# Patient Record
Sex: MALE | Race: WHITE | URBAN - METROPOLITAN AREA
[De-identification: names, ages, dates, MRNs, and addresses within clinical notes are randomized per-mention and may not be internally consistent; named-entity substitution may affect disease eponyms.]

---

## 2017-01-11 ENCOUNTER — TELEPHONE (OUTPATIENT)
Dept: FAMILY MEDICINE CLINIC | Facility: CLINIC | Age: 37
End: 2017-01-11

## 2017-02-09 ENCOUNTER — TELEPHONE (OUTPATIENT)
Dept: FAMILY MEDICINE CLINIC | Facility: CLINIC | Age: 37
End: 2017-02-09

## 2017-02-09 RX ORDER — LISINOPRIL AND HYDROCHLOROTHIAZIDE 12.5; 1 MG/1; MG/1
1 TABLET ORAL DAILY
Qty: 90 TABLET | Refills: 0 | Status: SHIPPED | OUTPATIENT
Start: 2017-02-09 | End: 2017-05-22

## 2017-02-09 NOTE — TELEPHONE ENCOUNTER
Pt is requesting a re-fill for medication Lisinopril. Pt states he only has 2 left. Pt states he is new to pharmacy. Please advise.        Current outpatient prescriptions:   •  Lisinopril-Hydrochlorothiazide 10-12.5 MG Oral Tab, Take 1 tablet by mouth da

## 2017-02-09 NOTE — TELEPHONE ENCOUNTER
Requesting Lisinopril-HCTZ refill    LMTCB, transfer to [facility] ext [phone].  Noted at last OV pt was to RTC 4-6 weeks for follow up, pt needs to schedule appt     Prescription refilled per FM refill protocol    Hypertensive Medications  Protocol Criteria:  ·

## 2017-03-03 ENCOUNTER — TELEPHONE (OUTPATIENT)
Dept: FAMILY MEDICINE CLINIC | Facility: CLINIC | Age: 37
End: 2017-03-03

## 2017-03-03 NOTE — TELEPHONE ENCOUNTER
Pt would like to know if  [facility] still has a copy of the DCFS forms that was filled out last fall? If so pt will need a copy of those forms faxed to Fax:[phone]. This is pt personal fax.

## 2017-03-06 NOTE — TELEPHONE ENCOUNTER
Informed pt that DCFS form is printed and ready for pickup at [facility] . Pt's wife Geno Putt with  the forms and knows that an ID is required. Pt voiced understanding.

## 2017-03-07 PROBLEM — I10 ESSENTIAL HYPERTENSION: Status: ACTIVE | Noted: 2017-03-07

## 2017-03-07 NOTE — TELEPHONE ENCOUNTER
Pt. Calling to find out if RN was able to find the old form from Last fall? Pt. States that he also wants to get a copy of the new completed form faxed to him before 2pm, if not then he can pick it up.

## 2017-05-22 RX ORDER — LISINOPRIL AND HYDROCHLOROTHIAZIDE 12.5; 1 MG/1; MG/1
TABLET ORAL
Qty: 90 TABLET | Refills: 0 | Status: SHIPPED | OUTPATIENT
Start: 2017-05-22 | End: 2017-06-19

## 2017-06-19 ENCOUNTER — OFFICE VISIT (OUTPATIENT)
Dept: FAMILY MEDICINE CLINIC | Facility: CLINIC | Age: 37
End: 2017-06-19

## 2017-06-19 ENCOUNTER — APPOINTMENT (OUTPATIENT)
Dept: LAB | Age: 37
End: 2017-06-19
Attending: FAMILY MEDICINE
Payer: COMMERCIAL

## 2017-06-19 VITALS
RESPIRATION RATE: 18 BRPM | DIASTOLIC BLOOD PRESSURE: 76 MMHG | BODY MASS INDEX: 19 KG/M2 | TEMPERATURE: 98 F | SYSTOLIC BLOOD PRESSURE: 116 MMHG | WEIGHT: 156 LBS | HEART RATE: 82 BPM

## 2017-06-19 DIAGNOSIS — I10 ESSENTIAL HYPERTENSION: Primary | ICD-10-CM

## 2017-06-19 DIAGNOSIS — I10 ESSENTIAL HYPERTENSION: ICD-10-CM

## 2017-06-19 PROCEDURE — 99212 OFFICE O/P EST SF 10 MIN: CPT | Performed by: FAMILY MEDICINE

## 2017-06-19 PROCEDURE — 80048 BASIC METABOLIC PNL TOTAL CA: CPT

## 2017-06-19 PROCEDURE — 99213 OFFICE O/P EST LOW 20 MIN: CPT | Performed by: FAMILY MEDICINE

## 2017-06-19 RX ORDER — LISINOPRIL AND HYDROCHLOROTHIAZIDE 12.5; 1 MG/1; MG/1
1 TABLET ORAL
Qty: 90 TABLET | Refills: 1 | Status: SHIPPED | OUTPATIENT
Start: 2017-06-19 | End: 2018-03-26

## 2017-06-19 NOTE — PROGRESS NOTES
HPI:    Patient ID: Deangelo Jaimes is a 42year old male. Hypertension  This is a chronic problem. The current episode started more than 1 month ago. The problem has been gradually improving since onset. The problem is controlled.  Pertinent negatives i Lisinopril-Hydrochlorothiazide 10-12.5 MG Oral Tab 90 tablet 1      Sig: Take 1 tablet by mouth once daily.            Imaging & Referrals:  None       BONNIE#[id number]

## 2017-06-21 ENCOUNTER — TELEPHONE (OUTPATIENT)
Dept: FAMILY MEDICINE CLINIC | Facility: CLINIC | Age: 37
End: 2017-06-21

## 2017-06-21 NOTE — TELEPHONE ENCOUNTER
----- Message from Mariposa Rivera MD sent at 6/20/2017  9:20 AM CDT -----  Please let patient know his electrolytes and kidney function are normal on medication.   However his potassium is lower normal.  Try to eat a lot of high potassium foods such as avoc

## 2017-06-22 NOTE — TELEPHONE ENCOUNTER
Verified pt name and . Reviewed test results and recommendations with pt per doctor's instructions. Pt agreed with plan of care and will adjust diet accordingly.

## 2017-07-31 ENCOUNTER — TELEPHONE (OUTPATIENT)
Dept: FAMILY MEDICINE CLINIC | Facility: CLINIC | Age: 37
End: 2017-07-31

## 2017-09-19 ENCOUNTER — IMMUNIZATION (OUTPATIENT)
Dept: FAMILY MEDICINE CLINIC | Facility: CLINIC | Age: 37
End: 2017-09-19

## 2017-09-19 DIAGNOSIS — Z23 NEED FOR VACCINATION: ICD-10-CM

## 2017-09-19 PROCEDURE — 90686 IIV4 VACC NO PRSV 0.5 ML IM: CPT | Performed by: FAMILY MEDICINE

## 2017-09-19 PROCEDURE — 90471 IMMUNIZATION ADMIN: CPT | Performed by: FAMILY MEDICINE

## 2018-03-26 RX ORDER — LISINOPRIL AND HYDROCHLOROTHIAZIDE 12.5; 1 MG/1; MG/1
TABLET ORAL
Qty: 90 TABLET | Refills: 0 | Status: SHIPPED | OUTPATIENT
Start: 2018-03-26 | End: 2018-07-07

## 2018-03-26 NOTE — TELEPHONE ENCOUNTER
Please advise on refill request. Refill denied due to no appointment in last 6 months.  LOV 06/19/17    Hypertensive Medications  Protocol Criteria:  · Appointment scheduled in the past 6 months or in the next 3 months  · BMP or CMP in the past 12 months  ·

## 2018-07-08 RX ORDER — LISINOPRIL AND HYDROCHLOROTHIAZIDE 12.5; 1 MG/1; MG/1
TABLET ORAL
Qty: 90 TABLET | Refills: 0 | Status: SHIPPED | OUTPATIENT
Start: 2018-07-08 | End: 2018-10-26

## 2018-10-27 RX ORDER — LISINOPRIL AND HYDROCHLOROTHIAZIDE 12.5; 1 MG/1; MG/1
TABLET ORAL
Qty: 90 TABLET | Refills: 0 | Status: SHIPPED | OUTPATIENT
Start: 2018-10-27 | End: 2019-03-02

## 2018-11-24 ENCOUNTER — IMMUNIZATION (OUTPATIENT)
Dept: FAMILY MEDICINE CLINIC | Facility: CLINIC | Age: 38
End: 2018-11-24
Payer: COMMERCIAL

## 2018-11-24 DIAGNOSIS — Z23 NEED FOR VACCINATION: ICD-10-CM

## 2018-11-24 PROCEDURE — 90686 IIV4 VACC NO PRSV 0.5 ML IM: CPT | Performed by: FAMILY MEDICINE

## 2018-11-24 PROCEDURE — 90471 IMMUNIZATION ADMIN: CPT | Performed by: FAMILY MEDICINE

## 2019-02-12 RX ORDER — LISINOPRIL AND HYDROCHLOROTHIAZIDE 12.5; 1 MG/1; MG/1
TABLET ORAL
Qty: 90 TABLET | Refills: 0 | OUTPATIENT
Start: 2019-02-12

## 2019-02-12 NOTE — TELEPHONE ENCOUNTER
Pt is aware Dr Gertrude Leija a f/u appt and will callback to schedule appt when get spouse's schedule.

## 2019-02-12 NOTE — TELEPHONE ENCOUNTER
Call center please call and schedule an appointment. Thank You. If the patient needs medication please send back to us. Thanks.

## 2019-02-12 NOTE — TELEPHONE ENCOUNTER
Please contact patient, scheduled for follow-up hypertension. Be sure he has enough medication to take until appointment.

## 2019-03-02 RX ORDER — LISINOPRIL AND HYDROCHLOROTHIAZIDE 12.5; 1 MG/1; MG/1
1 TABLET ORAL
Qty: 90 TABLET | Refills: 0 | Status: SHIPPED | OUTPATIENT
Start: 2019-03-02 | End: 2019-05-28

## 2019-03-02 NOTE — TELEPHONE ENCOUNTER
Dr Narcisa Saleem spoke to pt and states if he can have enough lisinopril medication to get him thru till his appt 3/19. Please Advise.

## 2019-03-02 NOTE — TELEPHONE ENCOUNTER
Pt states that he has run out of his Lisinipril med. And requesting to a get a refill. Pt. Has sched a f/up appt. For 3/19. Pt. Requesting to get a call back to confirm that refill was sent.

## 2019-03-19 ENCOUNTER — LAB ENCOUNTER (OUTPATIENT)
Dept: LAB | Age: 39
End: 2019-03-19
Attending: FAMILY MEDICINE
Payer: COMMERCIAL

## 2019-03-19 ENCOUNTER — OFFICE VISIT (OUTPATIENT)
Dept: FAMILY MEDICINE CLINIC | Facility: CLINIC | Age: 39
End: 2019-03-19
Payer: COMMERCIAL

## 2019-03-19 VITALS
HEIGHT: 74.75 IN | SYSTOLIC BLOOD PRESSURE: 120 MMHG | RESPIRATION RATE: 18 BRPM | HEART RATE: 65 BPM | TEMPERATURE: 98 F | WEIGHT: 159.19 LBS | BODY MASS INDEX: 20 KG/M2 | DIASTOLIC BLOOD PRESSURE: 80 MMHG

## 2019-03-19 DIAGNOSIS — I10 ESSENTIAL HYPERTENSION: Primary | ICD-10-CM

## 2019-03-19 DIAGNOSIS — I10 ESSENTIAL HYPERTENSION: ICD-10-CM

## 2019-03-19 DIAGNOSIS — F43.9 STRESS: ICD-10-CM

## 2019-03-19 LAB
ANION GAP SERPL CALC-SCNC: 4 MMOL/L (ref 0–18)
BUN BLD-MCNC: 16 MG/DL (ref 7–18)
BUN/CREAT SERPL: 19.8 (ref 10–20)
CALCIUM BLD-MCNC: 9.4 MG/DL (ref 8.5–10.1)
CHLORIDE SERPL-SCNC: 105 MMOL/L (ref 98–107)
CHOLEST SMN-MCNC: 180 MG/DL (ref ?–200)
CO2 SERPL-SCNC: 30 MMOL/L (ref 21–32)
CREAT BLD-MCNC: 0.81 MG/DL (ref 0.7–1.3)
GLUCOSE BLD-MCNC: 95 MG/DL (ref 70–99)
HDLC SERPL-MCNC: 81 MG/DL (ref 40–59)
LDLC SERPL CALC-MCNC: 83 MG/DL (ref ?–100)
NONHDLC SERPL-MCNC: 99 MG/DL (ref ?–130)
OSMOLALITY SERPL CALC.SUM OF ELEC: 289 MOSM/KG (ref 275–295)
POTASSIUM SERPL-SCNC: 3.4 MMOL/L (ref 3.5–5.1)
SODIUM SERPL-SCNC: 139 MMOL/L (ref 136–145)
TRIGL SERPL-MCNC: 80 MG/DL (ref 30–149)
VLDLC SERPL CALC-MCNC: 16 MG/DL (ref 0–30)

## 2019-03-19 PROCEDURE — 36415 COLL VENOUS BLD VENIPUNCTURE: CPT

## 2019-03-19 PROCEDURE — 80048 BASIC METABOLIC PNL TOTAL CA: CPT

## 2019-03-19 PROCEDURE — 80061 LIPID PANEL: CPT

## 2019-03-19 PROCEDURE — 99212 OFFICE O/P EST SF 10 MIN: CPT | Performed by: FAMILY MEDICINE

## 2019-03-19 PROCEDURE — 99214 OFFICE O/P EST MOD 30 MIN: CPT | Performed by: FAMILY MEDICINE

## 2019-03-19 NOTE — PROGRESS NOTES
HPI:    Patient ID: Marie Leyvaowman is a 34year old male. Hypertension   This is a chronic problem. The current episode started more than 1 year ago. The problem is unchanged. The problem is controlled.  Pertinent negatives include no blurred vision, ch oil.    Orders Placed This Encounter      Basic Metabolic Panel (8) [E]      Lipid Panel [E]      Meds This Visit:  Requested Prescriptions      No prescriptions requested or ordered in this encounter       Imaging & Referrals:  None       NV#[id number]

## 2019-03-23 NOTE — PROGRESS NOTES
Pt informed of lab result & MD recommendation, pt stated understanding.       Future Appointments  9/17/2019  11:00 AM   MD Wenceslao Diallo [address]

## 2019-05-29 RX ORDER — LISINOPRIL AND HYDROCHLOROTHIAZIDE 12.5; 1 MG/1; MG/1
TABLET ORAL
Qty: 90 TABLET | Refills: 1 | Status: SHIPPED | OUTPATIENT
Start: 2019-05-29 | End: 2019-12-13

## 2019-09-17 ENCOUNTER — OFFICE VISIT (OUTPATIENT)
Dept: FAMILY MEDICINE CLINIC | Facility: CLINIC | Age: 39
End: 2019-09-17
Payer: COMMERCIAL

## 2019-09-17 VITALS
HEART RATE: 76 BPM | RESPIRATION RATE: 18 BRPM | DIASTOLIC BLOOD PRESSURE: 84 MMHG | BODY MASS INDEX: 20.08 KG/M2 | TEMPERATURE: 98 F | HEIGHT: 74.7 IN | WEIGHT: 159.81 LBS | SYSTOLIC BLOOD PRESSURE: 139 MMHG

## 2019-09-17 DIAGNOSIS — Z00.00 ROUTINE PHYSICAL EXAMINATION: Primary | ICD-10-CM

## 2019-09-17 DIAGNOSIS — I10 ESSENTIAL HYPERTENSION: ICD-10-CM

## 2019-09-17 PROCEDURE — 90686 IIV4 VACC NO PRSV 0.5 ML IM: CPT | Performed by: FAMILY MEDICINE

## 2019-09-17 PROCEDURE — 90471 IMMUNIZATION ADMIN: CPT | Performed by: FAMILY MEDICINE

## 2019-09-17 PROCEDURE — 99396 PREV VISIT EST AGE 40-64: CPT | Performed by: FAMILY MEDICINE

## 2019-09-18 NOTE — PROGRESS NOTES
HPI:    Patient ID: Meyer Cowden is a 37year old male. HPI    Review of Systems   Constitutional: Negative. Respiratory: Negative. Cardiovascular: Negative. Gastrointestinal: Negative. Skin: Negative. Neurological: Negative.

## 2019-12-14 RX ORDER — LISINOPRIL AND HYDROCHLOROTHIAZIDE 12.5; 1 MG/1; MG/1
TABLET ORAL
Qty: 90 TABLET | Refills: 0 | Status: SHIPPED | OUTPATIENT
Start: 2019-12-14 | End: 2020-03-20

## 2019-12-14 NOTE — TELEPHONE ENCOUNTER
Refill passed per [facility] protocol.   Hypertensive Medications  Protocol Criteria:  · Appointment scheduled in the past 6 months or in the next 3 months  · BMP or CMP in the past 12 months  · Creatinine result < 2  Recent Outpatient Visits

## 2020-03-20 RX ORDER — LISINOPRIL AND HYDROCHLOROTHIAZIDE 12.5; 1 MG/1; MG/1
TABLET ORAL
Qty: 90 TABLET | Refills: 0 | Status: SHIPPED | OUTPATIENT
Start: 2020-03-20 | End: 2020-05-20

## 2020-05-20 ENCOUNTER — OFFICE VISIT (OUTPATIENT)
Dept: FAMILY MEDICINE CLINIC | Facility: CLINIC | Age: 40
End: 2020-05-20
Payer: COMMERCIAL

## 2020-05-20 DIAGNOSIS — I10 ESSENTIAL HYPERTENSION: Primary | ICD-10-CM

## 2020-05-20 PROCEDURE — 99213 OFFICE O/P EST LOW 20 MIN: CPT | Performed by: FAMILY MEDICINE

## 2020-05-20 RX ORDER — LISINOPRIL AND HYDROCHLOROTHIAZIDE 12.5; 1 MG/1; MG/1
1 TABLET ORAL DAILY
Qty: 90 TABLET | Refills: 1 | Status: SHIPPED | OUTPATIENT
Start: 2020-05-20 | End: 2020-09-29

## 2020-05-20 NOTE — PROGRESS NOTES
HPI:    Patient ID: Radha Rodriguez is a 41year old male. Virtual Telephone Check-In    Radha Rodriguez verbally consents to a Virtual/Telephone Check-In visit on 05/20/20.     Patient understands and accepts financial responsibility for any deductible, Lisinopril-hydroCHLOROthiazide 10-12.5 MG Oral Tab Take 1 tablet by mouth daily. 90 tablet 1     Allergies:No Known Allergies   PHYSICAL EXAM:   Physical Exam   Constitutional: He is oriented to person, place, and time. No distress.    Pulmonary/Chest: No r

## 2020-08-26 ENCOUNTER — TELEPHONE (OUTPATIENT)
Dept: FAMILY MEDICINE CLINIC | Facility: CLINIC | Age: 40
End: 2020-08-26

## 2020-08-26 NOTE — TELEPHONE ENCOUNTER
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 \" [employer] website as well as [facility], [facility], [facility] locations.  Per pt his neighbor tested at [facility] and

## 2020-09-29 ENCOUNTER — TELEPHONE (OUTPATIENT)
Dept: FAMILY MEDICINE CLINIC | Facility: CLINIC | Age: 40
End: 2020-09-29

## 2020-09-29 RX ORDER — LISINOPRIL AND HYDROCHLOROTHIAZIDE 12.5; 1 MG/1; MG/1
1 TABLET ORAL DAILY
Qty: 90 TABLET | Refills: 1 | Status: SHIPPED | OUTPATIENT
Start: 2020-09-29 | End: 2020-10-16

## 2020-09-29 NOTE — TELEPHONE ENCOUNTER
Keshawn Moreno needs a refill of:             • Lisinopril-hydroCHLOROthiazide 10-12.5 MG Oral Tab Take 1 tablet by mouth daily.  90 tablet 1

## 2020-10-16 ENCOUNTER — TELEPHONE (OUTPATIENT)
Dept: INTERNAL MEDICINE CLINIC | Facility: CLINIC | Age: 40
End: 2020-10-16

## 2020-10-16 RX ORDER — HYDROCHLOROTHIAZIDE 12.5 MG/1
12.5 TABLET ORAL DAILY
Qty: 90 TABLET | Refills: 0 | Status: SHIPPED | OUTPATIENT
Start: 2020-10-16 | End: 2021-07-29

## 2020-10-16 RX ORDER — LISINOPRIL 10 MG/1
10 TABLET ORAL DAILY
Qty: 90 TABLET | Refills: 0 | Status: SHIPPED | OUTPATIENT
Start: 2020-10-16 | End: 2021-07-29

## 2020-10-16 NOTE — TELEPHONE ENCOUNTER
Can this be sent as 2 separate prescriptions ? If so it has been pended for approval. If not please advise.

## 2020-11-10 ENCOUNTER — IMMUNIZATION (OUTPATIENT)
Dept: FAMILY MEDICINE CLINIC | Facility: CLINIC | Age: 40
End: 2020-11-10
Payer: COMMERCIAL

## 2020-11-10 DIAGNOSIS — Z23 NEED FOR VACCINATION: ICD-10-CM

## 2020-11-10 PROCEDURE — 90686 IIV4 VACC NO PRSV 0.5 ML IM: CPT | Performed by: FAMILY MEDICINE

## 2020-11-10 PROCEDURE — 90471 IMMUNIZATION ADMIN: CPT | Performed by: FAMILY MEDICINE

## 2021-07-22 ENCOUNTER — HOSPITAL ENCOUNTER (OUTPATIENT)
Age: 41
Discharge: HOME | End: 2021-07-22
Payer: COMMERCIAL

## 2021-07-22 VITALS
SYSTOLIC BLOOD PRESSURE: 142 MMHG | OXYGEN SATURATION: 100 % | RESPIRATION RATE: 18 BRPM | TEMPERATURE: 97 F | HEART RATE: 68 BPM | DIASTOLIC BLOOD PRESSURE: 88 MMHG | BODY MASS INDEX: 19.89 KG/M2 | WEIGHT: 160 LBS | HEIGHT: 75 IN

## 2021-07-22 DIAGNOSIS — J02.9 VIRAL PHARYNGITIS: ICD-10-CM

## 2021-07-22 DIAGNOSIS — Z11.52 ENCOUNTER FOR SCREENING FOR COVID-19: Primary | ICD-10-CM

## 2021-07-22 LAB
S PYO AG THROAT QL: NEGATIVE
SARS-COV-2 RNA RESP QL NAA+PROBE: NOT DETECTED

## 2021-07-22 PROCEDURE — 99203 OFFICE O/P NEW LOW 30 MIN: CPT | Performed by: NURSE PRACTITIONER

## 2021-07-22 PROCEDURE — 87880 STREP A ASSAY W/OPTIC: CPT | Performed by: NURSE PRACTITIONER

## 2021-07-22 PROCEDURE — U0002 COVID-19 LAB TEST NON-CDC: HCPCS | Performed by: NURSE PRACTITIONER

## 2021-07-22 NOTE — ED PROVIDER NOTES
Patient Seen in: [facility]      History   Patient presents with:  Testing    Stated Complaint: Covid testing    HPI/Subjective:   HPI    49-year-old male presents to the immediate care requesting Covid testing.   He states he has had a summe Palpations: Abdomen is soft. Tenderness: There is no abdominal tenderness. Musculoskeletal:         General: No tenderness. Cervical back: Neck supple. Skin:     Findings: No rash.    Neurological:      Mental Status: He is alert and oriented

## 2021-07-28 ENCOUNTER — TELEPHONE (OUTPATIENT)
Dept: FAMILY MEDICINE CLINIC | Facility: CLINIC | Age: 41
End: 2021-07-28

## 2021-07-28 NOTE — TELEPHONE ENCOUNTER
Herminio Brownluna needs a refill of Lisinopril-hydrochlorothiazide 10/12.5mg Tablets #90, Take 1 tablet by mouth daily.

## 2021-07-29 RX ORDER — LISINOPRIL AND HYDROCHLOROTHIAZIDE 12.5; 1 MG/1; MG/1
1 TABLET ORAL DAILY
Qty: 90 TABLET | Refills: 3 | Status: SHIPPED | OUTPATIENT
Start: 2021-07-29 | End: 2021-11-18 | Stop reason: ALTCHOICE

## 2021-07-29 NOTE — TELEPHONE ENCOUNTER
Please contact patient to make appointment for routine physical.  Send back for refills if needed prior to visit.

## 2021-07-29 NOTE — TELEPHONE ENCOUNTER
Pt made an appt to see Dr. Dawn Damon for his physical on 9-23-21. This was the first available for the doctor.

## 2021-10-15 ENCOUNTER — HOSPITAL ENCOUNTER (OUTPATIENT)
Age: 41
Discharge: HOME | End: 2021-10-15
Payer: COMMERCIAL

## 2021-10-15 VITALS
TEMPERATURE: 98 F | DIASTOLIC BLOOD PRESSURE: 94 MMHG | OXYGEN SATURATION: 99 % | SYSTOLIC BLOOD PRESSURE: 155 MMHG | RESPIRATION RATE: 18 BRPM | HEART RATE: 60 BPM

## 2021-10-15 DIAGNOSIS — J02.9 VIRAL PHARYNGITIS: Primary | ICD-10-CM

## 2021-10-15 DIAGNOSIS — Z11.52 ENCOUNTER FOR SCREENING FOR COVID-19: ICD-10-CM

## 2021-10-15 DIAGNOSIS — Z20.822 LAB TEST NEGATIVE FOR COVID-19 VIRUS: ICD-10-CM

## 2021-10-15 PROCEDURE — 99213 OFFICE O/P EST LOW 20 MIN: CPT | Performed by: NURSE PRACTITIONER

## 2021-10-15 PROCEDURE — 87880 STREP A ASSAY W/OPTIC: CPT | Performed by: NURSE PRACTITIONER

## 2021-10-15 PROCEDURE — U0002 COVID-19 LAB TEST NON-CDC: HCPCS | Performed by: NURSE PRACTITIONER

## 2021-10-15 RX ORDER — LISINOPRIL 10 MG/1
10 TABLET ORAL DAILY
COMMUNITY
End: 2021-11-18

## 2021-10-15 NOTE — ED PROVIDER NOTES
Patient Seen in: [facility]      History   Patient presents with:  Sore Throat    Stated Complaint: covid test    Subjective:   Well-appearing 15-year-old male presents for COVID-19 testing and strep throat testing.   Patient communicates aston membranes moist. Left and right tympanic membranes normal.     Neck: No cervical lymphadenopathy. Supple. Normal ROM. Heart: Regular rate and rhythm, normal S1, normal S2.    Lungs: Clear to auscultation.  Good inspiratory effort. + Airway entry bilatera

## 2021-10-15 NOTE — ED INITIAL ASSESSMENT (HPI)
Pt states having a sore throat that began yesterday. Pt states has been in public spaces more often than usual recently and would like covid and strep testing.

## 2021-11-18 ENCOUNTER — LAB ENCOUNTER (OUTPATIENT)
Dept: LAB | Age: 41
End: 2021-11-18
Attending: FAMILY MEDICINE
Payer: COMMERCIAL

## 2021-11-18 ENCOUNTER — OFFICE VISIT (OUTPATIENT)
Dept: FAMILY MEDICINE CLINIC | Facility: CLINIC | Age: 41
End: 2021-11-18
Payer: COMMERCIAL

## 2021-11-18 VITALS
DIASTOLIC BLOOD PRESSURE: 92 MMHG | SYSTOLIC BLOOD PRESSURE: 144 MMHG | RESPIRATION RATE: 18 BRPM | HEIGHT: 75 IN | WEIGHT: 167.38 LBS | BODY MASS INDEX: 20.81 KG/M2 | HEART RATE: 66 BPM | TEMPERATURE: 98 F

## 2021-11-18 DIAGNOSIS — Z00.00 ROUTINE PHYSICAL EXAMINATION: Primary | ICD-10-CM

## 2021-11-18 DIAGNOSIS — L71.9 ROSACEA: ICD-10-CM

## 2021-11-18 DIAGNOSIS — Z00.00 ROUTINE PHYSICAL EXAMINATION: ICD-10-CM

## 2021-11-18 DIAGNOSIS — I10 ESSENTIAL HYPERTENSION: ICD-10-CM

## 2021-11-18 DIAGNOSIS — F43.9 STRESS: ICD-10-CM

## 2021-11-18 PROCEDURE — 36415 COLL VENOUS BLD VENIPUNCTURE: CPT

## 2021-11-18 PROCEDURE — 3077F SYST BP >= 140 MM HG: CPT | Performed by: FAMILY MEDICINE

## 2021-11-18 PROCEDURE — 3008F BODY MASS INDEX DOCD: CPT | Performed by: FAMILY MEDICINE

## 2021-11-18 PROCEDURE — 3080F DIAST BP >= 90 MM HG: CPT | Performed by: FAMILY MEDICINE

## 2021-11-18 PROCEDURE — 80048 BASIC METABOLIC PNL TOTAL CA: CPT

## 2021-11-18 PROCEDURE — 99396 PREV VISIT EST AGE 40-64: CPT | Performed by: FAMILY MEDICINE

## 2021-11-18 PROCEDURE — 80061 LIPID PANEL: CPT

## 2021-11-18 PROCEDURE — 85027 COMPLETE CBC AUTOMATED: CPT

## 2021-11-18 RX ORDER — LISINOPRIL 20 MG/1
20 TABLET ORAL DAILY
Qty: 90 TABLET | Refills: 3 | Status: SHIPPED | OUTPATIENT
Start: 2021-11-18

## 2021-11-18 NOTE — PROGRESS NOTES
Subjective:   Patient ID: Coreen Burr is a 35year old male. Patient presents for routine physical and follow-up hypertension      History/Other:   Review of Systems   Constitutional: Negative. Respiratory: Negative. Cardiovascular: Negative. time.    Rosacea–mild. Using OTC products. Stress–recently started therapy addressing issues of intergenerational demands, work/home demands. Orders Placed This Encounter      Lipid Panel      Basic Metabolic Panel (8)      CBC, Platelet;  No Differe

## 2022-04-28 ENCOUNTER — HOSPITAL ENCOUNTER (OUTPATIENT)
Age: 42
Discharge: HOME | End: 2022-04-28
Payer: COMMERCIAL

## 2022-04-28 VITALS
OXYGEN SATURATION: 99 % | TEMPERATURE: 98 F | SYSTOLIC BLOOD PRESSURE: 137 MMHG | RESPIRATION RATE: 18 BRPM | HEART RATE: 80 BPM | DIASTOLIC BLOOD PRESSURE: 96 MMHG

## 2022-04-28 DIAGNOSIS — J06.9 UPPER RESPIRATORY TRACT INFECTION, UNSPECIFIED TYPE: Primary | ICD-10-CM

## 2022-04-28 DIAGNOSIS — Z11.52 ENCOUNTER FOR SCREENING FOR COVID-19: ICD-10-CM

## 2022-04-28 LAB — SARS-COV-2 RNA RESP QL NAA+PROBE: NOT DETECTED

## 2022-04-28 PROCEDURE — 99213 OFFICE O/P EST LOW 20 MIN: CPT | Performed by: NURSE PRACTITIONER

## 2022-04-28 PROCEDURE — U0002 COVID-19 LAB TEST NON-CDC: HCPCS | Performed by: NURSE PRACTITIONER

## 2022-08-04 ENCOUNTER — LAB ENCOUNTER (OUTPATIENT)
Dept: LAB | Age: 42
End: 2022-08-04
Attending: FAMILY MEDICINE
Payer: COMMERCIAL

## 2022-08-04 DIAGNOSIS — R00.0 TACHYCARDIA: ICD-10-CM

## 2022-08-04 PROBLEM — F41.0 GENERALIZED ANXIETY DISORDER WITH PANIC ATTACKS: Status: ACTIVE | Noted: 2022-08-04

## 2022-08-04 PROBLEM — F41.1 GENERALIZED ANXIETY DISORDER WITH PANIC ATTACKS: Status: ACTIVE | Noted: 2022-08-04

## 2022-08-04 LAB
ANION GAP SERPL CALC-SCNC: 10 MMOL/L (ref 0–18)
BASOPHILS # BLD AUTO: 0.05 X10(3) UL (ref 0–0.2)
BASOPHILS NFR BLD AUTO: 1 %
BUN BLD-MCNC: 14 MG/DL (ref 7–18)
BUN/CREAT SERPL: 15.7 (ref 10–20)
CALCIUM BLD-MCNC: 9.2 MG/DL (ref 8.5–10.1)
CHLORIDE SERPL-SCNC: 107 MMOL/L (ref 98–112)
CO2 SERPL-SCNC: 24 MMOL/L (ref 21–32)
CREAT BLD-MCNC: 0.89 MG/DL
DEPRECATED RDW RBC AUTO: 39.1 FL (ref 35.1–46.3)
EOSINOPHIL # BLD AUTO: 0.1 X10(3) UL (ref 0–0.7)
EOSINOPHIL NFR BLD AUTO: 2 %
ERYTHROCYTE [DISTWIDTH] IN BLOOD BY AUTOMATED COUNT: 11.4 % (ref 11–15)
FASTING STATUS PATIENT QL REPORTED: NO
GFR SERPLBLD BASED ON 1.73 SQ M-ARVRAT: 108 ML/MIN/1.73M2 (ref 60–?)
GLUCOSE BLD-MCNC: 101 MG/DL (ref 70–99)
HCT VFR BLD AUTO: 41.8 %
HGB BLD-MCNC: 14 G/DL
IMM GRANULOCYTES # BLD AUTO: 0.02 X10(3) UL (ref 0–1)
IMM GRANULOCYTES NFR BLD: 0.4 %
LYMPHOCYTES # BLD AUTO: 1.84 X10(3) UL (ref 1–4)
LYMPHOCYTES NFR BLD AUTO: 37.1 %
MCH RBC QN AUTO: 31.3 PG (ref 26–34)
MCHC RBC AUTO-ENTMCNC: 33.5 G/DL (ref 31–37)
MCV RBC AUTO: 93.5 FL
MONOCYTES # BLD AUTO: 0.69 X10(3) UL (ref 0.1–1)
MONOCYTES NFR BLD AUTO: 13.9 %
NEUTROPHILS # BLD AUTO: 2.26 X10 (3) UL (ref 1.5–7.7)
NEUTROPHILS # BLD AUTO: 2.26 X10(3) UL (ref 1.5–7.7)
NEUTROPHILS NFR BLD AUTO: 45.6 %
OSMOLALITY SERPL CALC.SUM OF ELEC: 293 MOSM/KG (ref 275–295)
PLATELET # BLD AUTO: 250 10(3)UL (ref 150–450)
POTASSIUM SERPL-SCNC: 3.6 MMOL/L (ref 3.5–5.1)
RBC # BLD AUTO: 4.47 X10(6)UL
SODIUM SERPL-SCNC: 141 MMOL/L (ref 136–145)
TSI SER-ACNC: 1.38 MIU/ML (ref 0.36–3.74)
WBC # BLD AUTO: 5 X10(3) UL (ref 4–11)

## 2022-08-04 PROCEDURE — 80048 BASIC METABOLIC PNL TOTAL CA: CPT

## 2022-08-04 PROCEDURE — 84443 ASSAY THYROID STIM HORMONE: CPT

## 2022-08-04 PROCEDURE — 85025 COMPLETE CBC W/AUTO DIFF WBC: CPT

## 2022-08-04 PROCEDURE — 36415 COLL VENOUS BLD VENIPUNCTURE: CPT

## 2022-08-27 ENCOUNTER — HOSPITAL ENCOUNTER (OUTPATIENT)
Age: 42
Discharge: HOME | End: 2022-08-27
Payer: COMMERCIAL

## 2022-08-27 VITALS
HEART RATE: 71 BPM | OXYGEN SATURATION: 99 % | DIASTOLIC BLOOD PRESSURE: 102 MMHG | RESPIRATION RATE: 16 BRPM | TEMPERATURE: 97 F | WEIGHT: 160 LBS | HEIGHT: 75 IN | BODY MASS INDEX: 19.89 KG/M2 | SYSTOLIC BLOOD PRESSURE: 144 MMHG

## 2022-08-27 DIAGNOSIS — Z11.52 ENCOUNTER FOR SCREENING FOR COVID-19: ICD-10-CM

## 2022-08-27 DIAGNOSIS — R09.81 SINUS CONGESTION: Primary | ICD-10-CM

## 2022-08-27 LAB — SARS-COV-2 RNA RESP QL NAA+PROBE: NOT DETECTED

## 2022-08-27 PROCEDURE — U0002 COVID-19 LAB TEST NON-CDC: HCPCS | Performed by: EMERGENCY MEDICINE

## 2022-08-27 PROCEDURE — 99212 OFFICE O/P EST SF 10 MIN: CPT | Performed by: EMERGENCY MEDICINE

## 2022-08-27 NOTE — ED INITIAL ASSESSMENT (HPI)
Per pt if having scratchy ears and throat and nasal congestion since yesterday, requesting covid testing.

## 2022-09-08 RX ORDER — ESCITALOPRAM OXALATE 10 MG/1
10 TABLET ORAL DAILY
Qty: 90 TABLET | Refills: 1 | Status: SHIPPED | OUTPATIENT
Start: 2022-09-08

## 2022-09-08 NOTE — TELEPHONE ENCOUNTER
Refill passed [facility] [place] [facility].     Requested Prescriptions   Pending Prescriptions Disp Refills    ESCITALOPRAM 10 MG Oral Tab [Pharmacy Med Name: ESCITALOPRAM 10MG TABLETS] 87 tablet 0     Sig: TAKE 1/2 TABLET BY MOUTH DAILY FOR 5 DAYS, THEN TAKE 1 TABLET DAILY        Psychiatric Non-Scheduled (Anti-Anxiety) Passed - 9/8/2022  8:08 AM        Passed - In person appointment or virtual visit in the past 6 mos or appointment in next 3 mos       Recent Outpatient Visits              1 month ago Generalized anxiety disorder with panic attacks    [facility], [place], Avery Bhakta MD    Office Visit    9 months ago Routine physical examination    [facility], [place], Meggan Martinez MD    Office Visit    2 years ago Essential hypertension    [facility], [place], Avery Bhakta MD    Office Visit    2 years ago Routine physical examination    [facility], [place], Meggan Martinze MD    Office Visit    3 years ago Essential hypertension    [facility], [place], Avery Bhakta MD    Office Visit     Future Appointments         Provider Department Appt Notes    In 5 days Mayur Martinez MD [facility], [place], [address] 5 week follow up                   Recent Outpatient Visits              1 month ago Generalized anxiety disorder with panic attacks    [facility], [place], Meggan Martinez MD    Office Visit    9 months ago Routine physical examination    [facility], [place], Som Arredondo MD    Office Visit    2 years ago Essential hypertension    [facility], Som Benavidez MD    Office Visit    2 years ago Routine physical examination    [facility], [place], Avery Bhakta MD    Office Visit    3 years ago Essential hypertension    [facility], [place], Avery Bhakta MD    Office Visit          Future Appointments         Provider Department Appt Notes    In 5 days Mona Cote MD [facility], [facility], [address], [facility]

## 2023-05-24 ENCOUNTER — HOSPITAL ENCOUNTER (EMERGENCY)
Facility: HOSPITAL | Age: 43
Discharge: HOME | End: 2023-05-24
Attending: EMERGENCY MEDICINE
Payer: COMMERCIAL

## 2023-05-24 ENCOUNTER — TELEPHONE (OUTPATIENT)
Dept: FAMILY MEDICINE CLINIC | Facility: CLINIC | Age: 43
End: 2023-05-24

## 2023-05-24 ENCOUNTER — APPOINTMENT (OUTPATIENT)
Dept: CT IMAGING | Facility: HOSPITAL | Age: 43
End: 2023-05-24
Attending: EMERGENCY MEDICINE
Payer: COMMERCIAL

## 2023-05-24 ENCOUNTER — OFFICE VISIT (OUTPATIENT)
Dept: FAMILY MEDICINE CLINIC | Facility: CLINIC | Age: 43
End: 2023-05-24

## 2023-05-24 VITALS
TEMPERATURE: 98 F | BODY MASS INDEX: 21 KG/M2 | SYSTOLIC BLOOD PRESSURE: 213 MMHG | DIASTOLIC BLOOD PRESSURE: 112 MMHG | HEART RATE: 69 BPM | WEIGHT: 170.13 LBS

## 2023-05-24 VITALS
DIASTOLIC BLOOD PRESSURE: 97 MMHG | SYSTOLIC BLOOD PRESSURE: 155 MMHG | TEMPERATURE: 98 F | WEIGHT: 170 LBS | OXYGEN SATURATION: 98 % | BODY MASS INDEX: 21.14 KG/M2 | HEIGHT: 75 IN | HEART RATE: 86 BPM | RESPIRATION RATE: 17 BRPM

## 2023-05-24 DIAGNOSIS — R79.89 ELEVATED LFTS: ICD-10-CM

## 2023-05-24 DIAGNOSIS — I10 UNCONTROLLED HYPERTENSION: Primary | ICD-10-CM

## 2023-05-24 DIAGNOSIS — R74.01 TRANSAMINITIS: ICD-10-CM

## 2023-05-24 DIAGNOSIS — F41.8 DEPRESSION WITH ANXIETY: ICD-10-CM

## 2023-05-24 DIAGNOSIS — I10 ESSENTIAL HYPERTENSION: Primary | ICD-10-CM

## 2023-05-24 LAB
ALBUMIN SERPL-MCNC: 4.5 G/DL (ref 3.4–5)
ALP LIVER SERPL-CCNC: 58 U/L
ALT SERPL-CCNC: 279 U/L
ANION GAP SERPL CALC-SCNC: 9 MMOL/L (ref 0–18)
AST SERPL-CCNC: 391 U/L (ref 15–37)
ATRIAL RATE: 76 BPM
BASOPHILS # BLD AUTO: 0.06 X10(3) UL (ref 0–0.2)
BASOPHILS NFR BLD AUTO: 1.2 %
BILIRUB DIRECT SERPL-MCNC: 0.3 MG/DL (ref 0–0.2)
BILIRUB SERPL-MCNC: 1.2 MG/DL (ref 0.1–2)
BUN BLD-MCNC: 14 MG/DL (ref 7–18)
BUN/CREAT SERPL: 18.7 (ref 10–20)
CALCIUM BLD-MCNC: 9.3 MG/DL (ref 8.5–10.1)
CHLORIDE SERPL-SCNC: 106 MMOL/L (ref 98–112)
CO2 SERPL-SCNC: 25 MMOL/L (ref 21–32)
CREAT BLD-MCNC: 0.75 MG/DL
DEPRECATED RDW RBC AUTO: 40.6 FL (ref 35.1–46.3)
EOSINOPHIL # BLD AUTO: 0.02 X10(3) UL (ref 0–0.7)
EOSINOPHIL NFR BLD AUTO: 0.4 %
ERYTHROCYTE [DISTWIDTH] IN BLOOD BY AUTOMATED COUNT: 11.7 % (ref 11–15)
GFR SERPLBLD BASED ON 1.73 SQ M-ARVRAT: 113 ML/MIN/1.73M2 (ref 60–?)
GLUCOSE BLD-MCNC: 90 MG/DL (ref 70–99)
HCT VFR BLD AUTO: 39.3 %
HGB BLD-MCNC: 13.2 G/DL
IMM GRANULOCYTES # BLD AUTO: 0.01 X10(3) UL (ref 0–1)
IMM GRANULOCYTES NFR BLD: 0.2 %
LYMPHOCYTES # BLD AUTO: 1.37 X10(3) UL (ref 1–4)
LYMPHOCYTES NFR BLD AUTO: 27.8 %
MCH RBC QN AUTO: 31.7 PG (ref 26–34)
MCHC RBC AUTO-ENTMCNC: 33.6 G/DL (ref 31–37)
MCV RBC AUTO: 94.2 FL
MONOCYTES # BLD AUTO: 0.64 X10(3) UL (ref 0.1–1)
MONOCYTES NFR BLD AUTO: 13 %
NEUTROPHILS # BLD AUTO: 2.82 X10 (3) UL (ref 1.5–7.7)
NEUTROPHILS # BLD AUTO: 2.82 X10(3) UL (ref 1.5–7.7)
NEUTROPHILS NFR BLD AUTO: 57.4 %
OSMOLALITY SERPL CALC.SUM OF ELEC: 290 MOSM/KG (ref 275–295)
P AXIS: 76 DEGREES
P-R INTERVAL: 138 MS
PLATELET # BLD AUTO: 224 10(3)UL (ref 150–450)
POTASSIUM SERPL-SCNC: 3.7 MMOL/L (ref 3.5–5.1)
PROT SERPL-MCNC: 7.6 G/DL (ref 6.4–8.2)
Q-T INTERVAL: 406 MS
QRS DURATION: 110 MS
QTC CALCULATION (BEZET): 456 MS
R AXIS: 71 DEGREES
RBC # BLD AUTO: 4.17 X10(6)UL
SODIUM SERPL-SCNC: 140 MMOL/L (ref 136–145)
T AXIS: 46 DEGREES
TROPONIN I HIGH SENSITIVITY: 10 NG/L
VENTRICULAR RATE: 76 BPM
WBC # BLD AUTO: 4.9 X10(3) UL (ref 4–11)

## 2023-05-24 PROCEDURE — 93000 ELECTROCARDIOGRAM COMPLETE: CPT | Performed by: FAMILY MEDICINE

## 2023-05-24 PROCEDURE — 80076 HEPATIC FUNCTION PANEL: CPT | Performed by: EMERGENCY MEDICINE

## 2023-05-24 PROCEDURE — 80048 BASIC METABOLIC PNL TOTAL CA: CPT | Performed by: EMERGENCY MEDICINE

## 2023-05-24 PROCEDURE — 99284 EMERGENCY DEPT VISIT MOD MDM: CPT

## 2023-05-24 PROCEDURE — 99285 EMERGENCY DEPT VISIT HI MDM: CPT

## 2023-05-24 PROCEDURE — 84484 ASSAY OF TROPONIN QUANT: CPT | Performed by: EMERGENCY MEDICINE

## 2023-05-24 PROCEDURE — 70450 CT HEAD/BRAIN W/O DYE: CPT | Performed by: EMERGENCY MEDICINE

## 2023-05-24 PROCEDURE — 3080F DIAST BP >= 90 MM HG: CPT | Performed by: FAMILY MEDICINE

## 2023-05-24 PROCEDURE — 3077F SYST BP >= 140 MM HG: CPT | Performed by: FAMILY MEDICINE

## 2023-05-24 PROCEDURE — 96374 THER/PROPH/DIAG INJ IV PUSH: CPT

## 2023-05-24 PROCEDURE — 85025 COMPLETE CBC W/AUTO DIFF WBC: CPT | Performed by: EMERGENCY MEDICINE

## 2023-05-24 PROCEDURE — 99214 OFFICE O/P EST MOD 30 MIN: CPT | Performed by: FAMILY MEDICINE

## 2023-05-24 RX ORDER — HEPARIN SODIUM AND DEXTROSE 10000; 5 [USP'U]/100ML; G/100ML
12 INJECTION INTRAVENOUS ONCE
Status: DISCONTINUED | OUTPATIENT
Start: 2023-05-24 | End: 2023-05-24

## 2023-05-24 RX ORDER — SPIRONOLACTONE 50 MG/1
50 TABLET, FILM COATED ORAL DAILY
Qty: 30 TABLET | Refills: 2 | Status: SHIPPED | OUTPATIENT
Start: 2023-05-24

## 2023-05-24 RX ORDER — METOPROLOL SUCCINATE 100 MG/1
100 TABLET, EXTENDED RELEASE ORAL DAILY
Qty: 90 TABLET | Refills: 3 | Status: SHIPPED | OUTPATIENT
Start: 2023-05-24

## 2023-05-24 RX ORDER — HEPARIN SODIUM 1000 [USP'U]/ML
60 INJECTION, SOLUTION INTRAVENOUS; SUBCUTANEOUS ONCE
Status: DISCONTINUED | OUTPATIENT
Start: 2023-05-24 | End: 2023-05-24

## 2023-05-24 RX ORDER — HEPARIN SODIUM AND DEXTROSE 10000; 5 [USP'U]/100ML; G/100ML
INJECTION INTRAVENOUS CONTINUOUS
OUTPATIENT
Start: 2023-05-24

## 2023-05-24 RX ORDER — HYDRALAZINE HYDROCHLORIDE 20 MG/ML
10 INJECTION INTRAMUSCULAR; INTRAVENOUS ONCE
Status: COMPLETED | OUTPATIENT
Start: 2023-05-24 | End: 2023-05-24

## 2023-05-24 NOTE — TELEPHONE ENCOUNTER
Please let patient know I reviewed ER record. I recommend continuing lisinopril 20 mg daily, increase metoprolol to 100 mg daily. Also should start spironolactone 50 mg daily, but IMPORTANT do not start this until tomorrow after blood is drawn. For today he can take an extra metoprolol 50 mg.  Very important that he come to the office tomorrow morning at 8 AM to have additional labs drawn as ordered. Timing is important for these labs. Send me a [employer] message daily for the next week with resting blood pressure. Do not drink alcohol. Schedule follow-up appointment with me next week, okay to use res24.

## 2023-05-24 NOTE — ED INITIAL ASSESSMENT (HPI)
Patient presents to ER from PCP for concerns of elevated BP. Per patient saw his MD this AM to review medications, noting he has had increased anxiety, and has noticed elevated Bps. Advised to come to ER d/t BP reading  Notes during what he believes are anxiety attacks, he has disorientation, and increased sleeping. Denies current blurred vision, headache or dizziness.

## 2023-05-24 NOTE — TELEPHONE ENCOUNTER
Verified name and . Patient was seen in office with Dr. Selena Roman today 23 and was sent to emergency room. He is calling with update to state that testing and diagnostics were negative. He states he was administered medication to lower blood pressure and discharged. He is asking if Dr. Selena Roman can send prescription for blood pressure medication to pharmacy. Allergies reviewed and pharmacy confirmed.

## 2023-05-24 NOTE — TELEPHONE ENCOUNTER
Called patient, confirmed name and . Patient advised of Dr. Mauricio Horner message below. He will be at office for lab draws at 8am tomorrow. Patient verbalized understanding of medication changes. Patient will send resting blood pressures through [employer] daily. Patient scheduled to see Dr. Rasheeda Looney next week.       Future Appointments   Date Time Provider Estefania Longoria   2023 10:30 AM Keyla Griffin MD [address] [place] [address]

## 2023-05-25 ENCOUNTER — LAB ENCOUNTER (OUTPATIENT)
Dept: LAB | Age: 43
End: 2023-05-25
Attending: FAMILY MEDICINE
Payer: COMMERCIAL

## 2023-05-25 DIAGNOSIS — I10 UNCONTROLLED HYPERTENSION: ICD-10-CM

## 2023-05-25 DIAGNOSIS — R79.89 ELEVATED LFTS: ICD-10-CM

## 2023-05-25 LAB
ALBUMIN SERPL-MCNC: 4.4 G/DL (ref 3.4–5)
ALP LIVER SERPL-CCNC: 60 U/L
ALT SERPL-CCNC: 246 U/L
AST SERPL-CCNC: 255 U/L (ref 15–37)
BILIRUB DIRECT SERPL-MCNC: 0.4 MG/DL (ref 0–0.2)
BILIRUB SERPL-MCNC: 1.3 MG/DL (ref 0.1–2)
BILIRUB UR QL: NEGATIVE
CHOLEST SERPL-MCNC: 219 MG/DL (ref ?–200)
CLARITY UR: CLEAR
COLOR UR: YELLOW
FASTING PATIENT LIPID ANSWER: NO
GLUCOSE UR-MCNC: NORMAL MG/DL
HDLC SERPL-MCNC: 89 MG/DL (ref 40–59)
HGB UR QL STRIP.AUTO: NEGATIVE
IRON SATN MFR SERPL: 29 %
IRON SERPL-MCNC: 100 UG/DL
KETONES UR-MCNC: NEGATIVE MG/DL
LDLC SERPL CALC-MCNC: 118 MG/DL (ref ?–100)
LEUKOCYTE ESTERASE UR QL STRIP.AUTO: NEGATIVE
NITRITE UR QL STRIP.AUTO: NEGATIVE
NONHDLC SERPL-MCNC: 130 MG/DL (ref ?–130)
PH UR: 7 [PH] (ref 5–8)
PROT SERPL-MCNC: 7.7 G/DL (ref 6.4–8.2)
SP GR UR STRIP: 1.02 (ref 1–1.03)
TIBC SERPL-MCNC: 343 UG/DL (ref 240–450)
TRANSFERRIN SERPL-MCNC: 230 MG/DL (ref 200–360)
TRIGL SERPL-MCNC: 66 MG/DL (ref 30–149)
UROBILINOGEN UR STRIP-ACNC: NORMAL
VLDLC SERPL CALC-MCNC: 11 MG/DL (ref 0–30)

## 2023-05-25 PROCEDURE — 80061 LIPID PANEL: CPT

## 2023-05-25 PROCEDURE — 86706 HEP B SURFACE ANTIBODY: CPT

## 2023-05-25 PROCEDURE — 83540 ASSAY OF IRON: CPT

## 2023-05-25 PROCEDURE — 82088 ASSAY OF ALDOSTERONE: CPT

## 2023-05-25 PROCEDURE — 84466 ASSAY OF TRANSFERRIN: CPT

## 2023-05-25 PROCEDURE — 84244 ASSAY OF RENIN: CPT

## 2023-05-25 PROCEDURE — 36415 COLL VENOUS BLD VENIPUNCTURE: CPT

## 2023-05-25 PROCEDURE — 86803 HEPATITIS C AB TEST: CPT

## 2023-05-25 PROCEDURE — 86704 HEP B CORE ANTIBODY TOTAL: CPT

## 2023-05-25 PROCEDURE — 87340 HEPATITIS B SURFACE AG IA: CPT

## 2023-05-25 PROCEDURE — 80503 PATH CLIN CONSLTJ SF 5-20: CPT

## 2023-05-25 PROCEDURE — 81001 URINALYSIS AUTO W/SCOPE: CPT

## 2023-05-25 PROCEDURE — 86709 HEPATITIS A IGM ANTIBODY: CPT

## 2023-05-25 PROCEDURE — 80076 HEPATIC FUNCTION PANEL: CPT

## 2023-05-25 PROCEDURE — 86708 HEPATITIS A ANTIBODY: CPT

## 2023-05-26 ENCOUNTER — TELEPHONE (OUTPATIENT)
Dept: FAMILY MEDICINE CLINIC | Facility: CLINIC | Age: 43
End: 2023-05-26

## 2023-05-26 LAB
HAV AB SER QL IA: REACTIVE
HBV CORE AB SERPL QL IA: REACTIVE
HBV SURFACE AB SER QL: NONREACTIVE
HBV SURFACE AB SERPL IA-ACNC: <3.1 MIU/ML
HBV SURFACE AG SERPL QL IA: NONREACTIVE
HCV AB SERPL QL IA: NONREACTIVE

## 2023-05-27 LAB — HAV IGM SER QL: NONREACTIVE

## 2023-05-30 ENCOUNTER — OFFICE VISIT (OUTPATIENT)
Dept: FAMILY MEDICINE CLINIC | Facility: CLINIC | Age: 43
End: 2023-05-30

## 2023-05-30 ENCOUNTER — LAB ENCOUNTER (OUTPATIENT)
Dept: LAB | Age: 43
End: 2023-05-30
Attending: FAMILY MEDICINE
Payer: COMMERCIAL

## 2023-05-30 VITALS
HEART RATE: 50 BPM | DIASTOLIC BLOOD PRESSURE: 100 MMHG | BODY MASS INDEX: 22 KG/M2 | SYSTOLIC BLOOD PRESSURE: 148 MMHG | WEIGHT: 172 LBS | TEMPERATURE: 98 F

## 2023-05-30 DIAGNOSIS — F41.0 GENERALIZED ANXIETY DISORDER WITH PANIC ATTACKS: ICD-10-CM

## 2023-05-30 DIAGNOSIS — R94.31 ABNORMAL EKG: ICD-10-CM

## 2023-05-30 DIAGNOSIS — Z12.12 ENCOUNTER FOR COLORECTAL CANCER SCREENING: ICD-10-CM

## 2023-05-30 DIAGNOSIS — R00.0 TACHYCARDIA: ICD-10-CM

## 2023-05-30 DIAGNOSIS — I10 UNCONTROLLED HYPERTENSION: Primary | ICD-10-CM

## 2023-05-30 DIAGNOSIS — F41.1 GENERALIZED ANXIETY DISORDER WITH PANIC ATTACKS: ICD-10-CM

## 2023-05-30 DIAGNOSIS — R79.89 ELEVATED LFTS: ICD-10-CM

## 2023-05-30 DIAGNOSIS — R00.0 TACHYCARDIA, UNSPECIFIED: ICD-10-CM

## 2023-05-30 DIAGNOSIS — Z12.11 ENCOUNTER FOR COLORECTAL CANCER SCREENING: ICD-10-CM

## 2023-05-30 PROCEDURE — 3080F DIAST BP >= 90 MM HG: CPT | Performed by: FAMILY MEDICINE

## 2023-05-30 PROCEDURE — 3077F SYST BP >= 140 MM HG: CPT | Performed by: FAMILY MEDICINE

## 2023-05-30 PROCEDURE — 99214 OFFICE O/P EST MOD 30 MIN: CPT | Performed by: FAMILY MEDICINE

## 2023-05-30 PROCEDURE — 84585 ASSAY OF URINE VMA: CPT

## 2023-05-31 ENCOUNTER — TELEPHONE (OUTPATIENT)
Dept: FAMILY MEDICINE CLINIC | Facility: CLINIC | Age: 43
End: 2023-05-31

## 2023-05-31 DIAGNOSIS — R00.0 TACHYCARDIA, UNSPECIFIED: Primary | ICD-10-CM

## 2023-05-31 DIAGNOSIS — I10 UNCONTROLLED HYPERTENSION: Primary | ICD-10-CM

## 2023-05-31 DIAGNOSIS — R00.0 TACHYCARDIA: ICD-10-CM

## 2023-06-07 LAB
ALDOST/RENIN RATIO: 9.1
ALDOSTERONE: 12.4 NG/DL
RENIN ACTIVITY: 1.36 NG/ML/HR

## 2023-06-19 NOTE — TELEPHONE ENCOUNTER
Advised father of 's note below. Father verbalized understanding. Stating he will make an appointment with MD in [place] and then call Dr. Jonatan Hilliard office back to schedule appointment after [place] are back home [place].
Doctor, please see pt question below and advise.
Left message for father recommending physician visit 2-3 days after discharge.
Pt. 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 He has questions about  visits. He states that he will be in [place] for about 10 days, so he is not sure if the pt.  Will need to be seen by a Pediatrician during the 1st 10 days, or is he able to wa
24

## 2023-06-20 ENCOUNTER — OFFICE VISIT (OUTPATIENT)
Dept: FAMILY MEDICINE CLINIC | Facility: CLINIC | Age: 43
End: 2023-06-20

## 2023-06-20 VITALS
BODY MASS INDEX: 21 KG/M2 | SYSTOLIC BLOOD PRESSURE: 120 MMHG | DIASTOLIC BLOOD PRESSURE: 82 MMHG | WEIGHT: 167.5 LBS | TEMPERATURE: 97 F | HEART RATE: 66 BPM

## 2023-06-20 DIAGNOSIS — F41.0 GENERALIZED ANXIETY DISORDER WITH PANIC ATTACKS: ICD-10-CM

## 2023-06-20 DIAGNOSIS — R79.89 ELEVATED LFTS: ICD-10-CM

## 2023-06-20 DIAGNOSIS — F41.1 GENERALIZED ANXIETY DISORDER WITH PANIC ATTACKS: ICD-10-CM

## 2023-06-20 DIAGNOSIS — I10 ESSENTIAL HYPERTENSION: Primary | ICD-10-CM

## 2023-06-20 PROCEDURE — 3074F SYST BP LT 130 MM HG: CPT | Performed by: FAMILY MEDICINE

## 2023-06-20 PROCEDURE — 3079F DIAST BP 80-89 MM HG: CPT | Performed by: FAMILY MEDICINE

## 2023-06-20 PROCEDURE — 99214 OFFICE O/P EST MOD 30 MIN: CPT | Performed by: FAMILY MEDICINE

## 2023-06-20 RX ORDER — SPIRONOLACTONE 100 MG/1
100 TABLET, FILM COATED ORAL DAILY
Qty: 90 TABLET | Refills: 0 | Status: SHIPPED | OUTPATIENT
Start: 2023-06-20

## 2023-07-10 ENCOUNTER — OFFICE VISIT (OUTPATIENT)
Dept: FAMILY MEDICINE CLINIC | Facility: CLINIC | Age: 43
End: 2023-07-10

## 2023-07-10 VITALS
WEIGHT: 170.25 LBS | SYSTOLIC BLOOD PRESSURE: 134 MMHG | BODY MASS INDEX: 21 KG/M2 | HEART RATE: 70 BPM | DIASTOLIC BLOOD PRESSURE: 84 MMHG | TEMPERATURE: 98 F

## 2023-07-10 DIAGNOSIS — F41.1 GENERALIZED ANXIETY DISORDER WITH PANIC ATTACKS: ICD-10-CM

## 2023-07-10 DIAGNOSIS — R79.89 ELEVATED LFTS: ICD-10-CM

## 2023-07-10 DIAGNOSIS — I10 ESSENTIAL HYPERTENSION: Primary | ICD-10-CM

## 2023-07-10 DIAGNOSIS — F41.0 GENERALIZED ANXIETY DISORDER WITH PANIC ATTACKS: ICD-10-CM

## 2023-07-10 LAB
ALBUMIN SERPL-MCNC: 4.4 G/DL (ref 3.4–5)
ALBUMIN/GLOB SERPL: 1.3 {RATIO} (ref 1–2)
ALP LIVER SERPL-CCNC: 45 U/L
ALT SERPL-CCNC: 72 U/L
ANION GAP SERPL CALC-SCNC: 10 MMOL/L (ref 0–18)
AST SERPL-CCNC: 48 U/L (ref 15–37)
BILIRUB SERPL-MCNC: 0.8 MG/DL (ref 0.1–2)
BUN BLD-MCNC: 13 MG/DL (ref 7–18)
BUN/CREAT SERPL: 13.4 (ref 10–20)
CALCIUM BLD-MCNC: 9.3 MG/DL (ref 8.5–10.1)
CHLORIDE SERPL-SCNC: 107 MMOL/L (ref 98–112)
CO2 SERPL-SCNC: 25 MMOL/L (ref 21–32)
CREAT BLD-MCNC: 0.97 MG/DL
FASTING STATUS PATIENT QL REPORTED: NO
GFR SERPLBLD BASED ON 1.73 SQ M-ARVRAT: 98 ML/MIN/1.73M2 (ref 60–?)
GLOBULIN PLAS-MCNC: 3.3 G/DL (ref 2.8–4.4)
GLUCOSE BLD-MCNC: 93 MG/DL (ref 70–99)
OSMOLALITY SERPL CALC.SUM OF ELEC: 294 MOSM/KG (ref 275–295)
POTASSIUM SERPL-SCNC: 4 MMOL/L (ref 3.5–5.1)
PROT SERPL-MCNC: 7.7 G/DL (ref 6.4–8.2)
SODIUM SERPL-SCNC: 142 MMOL/L (ref 136–145)

## 2023-07-10 PROCEDURE — 3079F DIAST BP 80-89 MM HG: CPT | Performed by: FAMILY MEDICINE

## 2023-07-10 PROCEDURE — 3075F SYST BP GE 130 - 139MM HG: CPT | Performed by: FAMILY MEDICINE

## 2023-07-10 PROCEDURE — 80053 COMPREHEN METABOLIC PANEL: CPT | Performed by: FAMILY MEDICINE

## 2023-07-10 PROCEDURE — 99214 OFFICE O/P EST MOD 30 MIN: CPT | Performed by: FAMILY MEDICINE

## 2023-07-10 RX ORDER — LISINOPRIL 20 MG/1
20 TABLET ORAL DAILY
Qty: 90 TABLET | Refills: 3 | Status: SHIPPED | OUTPATIENT
Start: 2023-07-10

## 2023-07-10 NOTE — PROGRESS NOTES
Subjective:   Patient ID: Erickson Dang is a 37year old male. Hypertension  This is a chronic problem. The current episode started more than 1 year ago. The problem has been waxing and waning since onset. Pertinent negatives include no chest pain or headaches. There are no associated agents to hypertension. Risk factors for coronary artery disease include male gender and stress. Past treatments include beta blockers, diuretics and ACE inhibitors. Compliance problems include exercise and psychosocial issues. There is no history of angina or kidney disease. History/Other:   Review of Systems   Cardiovascular:  Negative for chest pain. Neurological:  Negative for headaches. Current Outpatient Medications   Medication Sig Dispense Refill    spironolactone 100 MG Oral Tab Take 1 tablet (100 mg total) by mouth daily. 90 tablet 0    metoprolol succinate  MG Oral Tablet 24 Hr Take 1 tablet (100 mg total) by mouth daily. 90 tablet 3    escitalopram 10 MG Oral Tab Take 1 tablet (10 mg total) by mouth daily. 90 tablet 1    lisinopril 20 MG Oral Tab Take 1 tablet (20 mg total) by mouth daily. 90 tablet 3     Allergies:No Known Allergies    Objective:   Physical Exam  Constitutional:       Appearance: Normal appearance. Cardiovascular:      Rate and Rhythm: Normal rate and regular rhythm. Pulses: Normal pulses. Heart sounds: Normal heart sounds. Pulmonary:      Effort: Pulmonary effort is normal.      Breath sounds: Normal breath sounds. Musculoskeletal:      Right lower leg: No edema. Left lower leg: No edema. Neurological:      Mental Status: He is alert. Assessment & Plan:   Essential hypertension  (primary encounter diagnosis)-blood pressure controlled with spironolactone, metoprolol, lisinopril. Tolerating medications well without side effects. Renal ultrasound pending, CT heart scan pending, VMA pending. Goal blood pressure less than 130/80.   Plan to continue current medications, try to start regular exercise and avoid substances as below. If not approaching goal at next visit in 2 months, consider additional medication. Elevated LFTs-see previous visits. Upcoming liver ultrasound scheduled. Family has noticed that patient seems to be more sensitive to substances last several months. Plan to recheck LFTs today    Generalized anxiety disorder with panic attacks-continues to see therapist, improved on Lexapro. However continues to have occasional alcohol and marijuana, and experiences disproportionate sedation with exposure to these substances. Strongly recommend abstaining from alcohol and other substances. He will work with his current therapist for recommendation for substance use counselor, discussed possibility of AA as additional support.      Orders Placed This Encounter      Comp Metabolic Panel (14) [E]      Meds This Visit:  Requested Prescriptions      No prescriptions requested or ordered in this encounter       Imaging & Referrals:  None

## 2023-07-10 NOTE — TELEPHONE ENCOUNTER
Refill passed per [email or web address] protocol. However, please review high alert drug-drug warning. Very High  Drug-Drug: spironolactone and lisinoprilHyperkalemia, possibly with cardiac arrhythmias or arrest, may occur with the combination of potassium-sparing diuretics and ACE inhibitors. Serum potassium concentrations and renal function should be monitored. Patients with renal impairment, diabetes, older age, severe heart failure, and risk for dehydration may be at greater risk. Requested Prescriptions   Pending Prescriptions Disp Refills    lisinopril 20 MG Oral Tab 90 tablet 3     Sig: Take 1 tablet (20 mg total) by mouth daily.        Hypertensive Medications Protocol Passed - 7/9/2023  8:49 PM        Passed - In person appointment in the past 12 or next 3 months     Recent Outpatient Visits              2 weeks ago Essential hypertension    [address], [place], [facility] Nathaniel Christopher MD    Office Visit    1 month ago Uncontrolled hypertension    [address], [place], [facility] Nathaniel Christopher MD    Office Visit    1 month ago Uncontrolled hypertension    [facility], [place], [facility] Nathaniel Christopher MD    Office Visit    9 months ago Generalized anxiety disorder with panic attacks    [facility], [place], [facility] Nathaniel Christopher MD    Office Visit    11 months ago Generalized anxiety disorder with panic attacks    [facility], [place], [facility] Nathaniel Christopher MD    Office Visit          Future Appointments         Provider Department Appt Notes    Tomorrow Nathaniel Christopher MD [address] [facility], [facility] Check in on bp, anxiety, et al.    In 2 weeks [address] [facility]     In 2 weeks ellie [address] Per Dr. Prachi Metcalf order               Passed - Last BP reading less than 140/90     BP Readings from Last 1 Encounters:  06/20/23 : 120/82              Passed - CMP or BMP in past 6 months     Recent Results (from the past 4392 hour(s))   Basic Metabolic Panel (8)    Collection Time: 05/24/23  1:06 PM   Result Value Ref Range    Glucose 90 70 - 99 mg/dL    Sodium 140 136 - 145 mmol/L    Potassium 3.7 3.5 - 5.1 mmol/L    Chloride 106 98 - 112 mmol/L    CO2 25.0 21.0 - 32.0 mmol/L    Anion Gap 9 0 - 18 mmol/L    BUN 14 7 - 18 mg/dL    Creatinine 0.75 0.70 - 1.30 mg/dL    BUN/CREA Ratio 18.7 10.0 - 20.0    Calcium, Total 9.3 8.5 - 10.1 mg/dL    Calculated Osmolality 290 275 - 295 mOsm/kg    eGFR-Cr 113 >=60 mL/min/1.73m2     *Note: Due to a large number of results and/or encounters for the requested time period, some results have not been displayed. A complete set of results can be found in Results Review.                Passed - In person appointment or virtual visit in the past 6 months     Recent Outpatient Visits              2 weeks ago Essential hypertension    [address], [address], [facility] MD Trish    Office Visit    1 month ago Uncontrolled hypertension    [address], [address], [facility] MD Trish    Office Visit    1 month ago Uncontrolled hypertension    [address], [address], [facility] Fred Hough MD    Office Visit    9 months ago Generalized anxiety disorder with panic attacks    [facility], [address], [facility] Fred Hough MD    Office Visit    11 months ago Generalized anxiety disorder with panic attacks    [facility], [address], [facility] Fred Hough MD    Office Visit          Future Appointments         Provider Department Appt Notes    Tomorrow Fred Hough  [facility] Check in on bp, anxiety, et al.    In 2 weeks [address] [facility]     In 2 weeks Cap[address] Per Dr. Tarik Coffey order               Passed [facility] or GFRNAA > 50     GFR Evaluation  EGFRCR: 113 , resulted on 5/24/2023

## 2023-08-03 ENCOUNTER — TELEMEDICINE (OUTPATIENT)
Dept: FAMILY MEDICINE CLINIC | Facility: CLINIC | Age: 43
End: 2023-08-03

## 2023-08-03 DIAGNOSIS — R40.4 TRANSIENT ALTERATION OF AWARENESS: Primary | ICD-10-CM

## 2023-08-03 DIAGNOSIS — F41.0 GENERALIZED ANXIETY DISORDER WITH PANIC ATTACKS: ICD-10-CM

## 2023-08-03 DIAGNOSIS — F41.8 DEPRESSION WITH ANXIETY: ICD-10-CM

## 2023-08-03 DIAGNOSIS — F41.1 GENERALIZED ANXIETY DISORDER WITH PANIC ATTACKS: ICD-10-CM

## 2023-08-03 PROCEDURE — 99214 OFFICE O/P EST MOD 30 MIN: CPT | Performed by: FAMILY MEDICINE
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
## 2023-08-17 ENCOUNTER — HOSPITAL ENCOUNTER (OUTPATIENT)
Dept: ELECTROPHYSIOLOGY | Facility: HOSPITAL | Age: 43
Discharge: HOME | End: 2023-08-17
Attending: FAMILY MEDICINE
Payer: COMMERCIAL

## 2023-08-17 DIAGNOSIS — R40.4 TRANSIENT ALTERATION OF AWARENESS: ICD-10-CM

## 2023-08-17 PROCEDURE — 95819 EEG AWAKE AND ASLEEP: CPT

## 2023-08-17 NOTE — PROCEDURES
EEG report    REFERRING PHYSICIAN: Carolyn Lou MD    PCP and phone number:  Corey Martinez, [address]     TECHNIQUE: 21 channels of EEG, 2 channels of EOG, and 1 channel of EKG were recorded utilizing the International 10/20 System. The recording was performed in a digitized monopolar referential format and playback was reformatted into various referential and bipolar montages utilizing appropriate filter settings. Automatic seizure and spike detection programs were utilized throughout the recording. Video was recorded during the study    CLINICAL DATA:  Patient is sent for the evaluation of possible seizures. MEDICATION:  Continuous Medications:      Scheduled Medications:    Current Outpatient Medications:     lisinopril 20 MG Oral Tab, Take 1 tablet (20 mg total) by mouth daily. , Disp: 90 tablet, Rfl: 3    spironolactone 100 MG Oral Tab, Take 1 tablet (100 mg total) by mouth daily. , Disp: 90 tablet, Rfl: 0    metoprolol succinate  MG Oral Tablet 24 Hr, Take 1 tablet (100 mg total) by mouth daily. , Disp: 90 tablet, Rfl: 3    escitalopram 10 MG Oral Tab, Take 1 tablet (10 mg total) by mouth daily. , Disp: 90 tablet, Rfl: 1    PRN Medications:      ACTIVATION:  Hyperventilation: Not done  Photic stimulation: Done, no abnormalities  Sleep: Normal sleep architecture was seen. BACKGROUND  While the patient was awake, the posterior dominant rhythm consisted of poorly-regulated 11-12 Hz rhythmic waveforms, symmetrically distributed over both posterior quadrants and was reactive to eye opening. EEG ABNORMALITY  None    IMPRESSION:  This is a normal EEG. No focal, lateralized, or epileptiform features are noted. Clinical correlation required.

## 2023-09-01 ENCOUNTER — HOSPITAL ENCOUNTER (EMERGENCY)
Facility: HOSPITAL | Age: 43
Discharge: HOME | End: 2023-09-01
Attending: EMERGENCY MEDICINE
Payer: COMMERCIAL

## 2023-09-01 ENCOUNTER — NURSE TRIAGE (OUTPATIENT)
Dept: FAMILY MEDICINE CLINIC | Facility: CLINIC | Age: 43
End: 2023-09-01

## 2023-09-01 VITALS
TEMPERATURE: 97 F | RESPIRATION RATE: 16 BRPM | HEART RATE: 78 BPM | OXYGEN SATURATION: 96 % | BODY MASS INDEX: 21.56 KG/M2 | HEIGHT: 74 IN | DIASTOLIC BLOOD PRESSURE: 86 MMHG | SYSTOLIC BLOOD PRESSURE: 153 MMHG | WEIGHT: 168 LBS

## 2023-09-01 DIAGNOSIS — F32.A DEPRESSION, UNSPECIFIED DEPRESSION TYPE: Primary | ICD-10-CM

## 2023-09-01 DIAGNOSIS — F41.9 ANXIETY: ICD-10-CM

## 2023-09-01 PROCEDURE — 99284 EMERGENCY DEPT VISIT MOD MDM: CPT
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
## 2023-09-02 NOTE — DISCHARGE INSTRUCTIONS
Dr. Eder Maddox recommended Intensive Outpatient therapy, please contact your insurance for a list of intensive outpatient providers. Please call the [facility] line at [phone] with any questions, or new/worsening symptoms. If you have any thoughts to harm yourself or others, please call 911 or have someone drive you to the nearest emergency room. Here's a list of providers:    [address] at  [facility]  [place], [address] [place]  [address] [facility], [address] [place] [address]  Adolescent & Adult  Intensive Outpatient  Partial Hospitalization  ([address] and [place])  [phone]  [email or web address]
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
## 2023-09-02 NOTE — ED QUICK NOTES
Pt a/ox4, respirations unlabored, speech full/clear, gait steady, no acute distress. All ED orders completed. .  Pt instructed to return to ED for any new/severe s/s or as directed by provider, and to see PMD/specialist ASAP or as directed by provider. Pt denies any CP, shortness of breath, headache, dizziness, blurry vision, n/v, or any other pertinent s/s r/t high BP. Pt instructed to return for any of these s/s and to followup w/ PMD for BP rechecks.

## 2024-11-14 ENCOUNTER — HOSPITAL ENCOUNTER (OUTPATIENT)
Age: 44
Discharge: HOME | End: 2024-11-14
Payer: COMMERCIAL

## 2024-11-14 VITALS
RESPIRATION RATE: 20 BRPM | DIASTOLIC BLOOD PRESSURE: 90 MMHG | SYSTOLIC BLOOD PRESSURE: 155 MMHG | TEMPERATURE: 98 F | OXYGEN SATURATION: 99 % | HEART RATE: 73 BPM

## 2024-11-14 DIAGNOSIS — J06.9 VIRAL URI WITH COUGH: Primary | ICD-10-CM

## 2024-11-14 LAB — SARS-COV-2 RNA RESP QL NAA+PROBE: NOT DETECTED

## 2024-11-14 PROCEDURE — 99213 OFFICE O/P EST LOW 20 MIN: CPT | Performed by: NURSE PRACTITIONER

## 2024-11-14 PROCEDURE — U0002 COVID-19 LAB TEST NON-CDC: HCPCS | Performed by: NURSE PRACTITIONER

## 2024-11-14 NOTE — ED PROVIDER NOTES
Patient Seen in: [facility]      History     Chief Complaint   Patient presents with    Cough/URI     Stated Complaint: cough, runny nose, sore throat    Subjective:   44-year-old male with medical conditions as noted below presents with complaints of generalized fatigue, generalized bodyaches, congestion and cough onset yesterday.  No fever/chills, chest pain, shortness of breath, abdominal pain, nausea/vomiting/diarrhea.  Wife with similar symptoms that started 2 days ago.  States his son was diagnosed and treated for pneumonia last week.              Objective:     Past Medical History:    Anxiety    Depression    Essential hypertension              History reviewed. No pertinent surgical history.             Social History     Socioeconomic History    Marital status:    Tobacco Use    Smoking status: Former     Types: Cigarettes    Smokeless tobacco: Never   Vaping Use    Vaping status: Never Used   Substance and Sexual Activity    Alcohol use: Yes     Comment: daily    Drug use: Yes     Types: Cannabis     Comment: occasionally   Other Topics Concern    Caffeine Concern Yes     Comment: coffee              Review of Systems   Constitutional:  Positive for fatigue. Negative for chills and fever.   HENT:  Positive for congestion. Negative for sore throat.    Respiratory:  Positive for cough. Negative for shortness of breath.    Cardiovascular:  Negative for chest pain.   Gastrointestinal:  Negative for abdominal pain, diarrhea, nausea and vomiting.   Musculoskeletal:  Positive for myalgias.   Neurological:  Negative for headaches.   All other systems reviewed and are negative.      Positive for stated complaint: cough, runny nose, sore throat  Other systems are as noted in HPI.  Constitutional and vital signs reviewed.      All other systems reviewed and negative except as noted above.    Physical Exam     ED Triage Vitals [11/14/24 0912]   /90   Pulse 73   Resp 20   Temp 98 °F (36.7  °C)   Temp src Oral   SpO2 99 %   O2 Device None (Room air)       Current Vitals:   Vital Signs  BP: 155/90  Pulse: 73  Resp: 20  Temp: 98 °F (36.7 °C)  Temp src: Oral    Oxygen Therapy  SpO2: 99 %  O2 Device: None (Room air)        Physical Exam  Vitals and nursing note reviewed.   Constitutional:       General: He is not in acute distress.     Appearance: Normal appearance. He is not ill-appearing.   HENT:      Head: Normocephalic.      Right Ear: Tympanic membrane and external ear normal.      Left Ear: Tympanic membrane and external ear normal.      Nose: Nose normal.      Mouth/Throat:      Mouth: Mucous membranes are moist.      Pharynx: Oropharynx is clear. Uvula midline.      Tonsils: No tonsillar exudate.   Cardiovascular:      Rate and Rhythm: Normal rate and regular rhythm.   Pulmonary:      Effort: Pulmonary effort is normal.      Breath sounds: Normal breath sounds.   Musculoskeletal:         General: Normal range of motion.      Cervical back: Normal range of motion and neck supple.   Skin:     General: Skin is warm and dry.   Neurological:      Mental Status: He is alert and oriented to person, place, and time.   Psychiatric:         Behavior: Behavior is cooperative.             ED Course     Labs Reviewed   RAPID SARS-COV-2 BY PCR - Normal                   MDM              Medical Decision Making  Patient is well-appearing.  I discussed differentials with patient including but not limited to viral uri vs pneumonia  Rapid COVID negative  Discussed with patient will defer chest x-ray has not even had symptoms for 24 hours.  24 hours is very short time.  To develop pneumonia.  Push fluids, cool mist humidifier, good hand washing  otc meds prn  Fu with PCP. Return/ ED precautions discussed      Problems Addressed:  Viral URI with cough: acute illness or injury    Amount and/or Complexity of Data Reviewed  Labs: ordered. Decision-making details documented in ED Course.    Risk  OTC  drugs.        Disposition and Plan     Clinical Impression:  1. Viral URI with cough         Disposition:  Discharge  11/14/2024  9:44 am    Follow-up:  Jennifer Rosales MD  [address] [place] [address]  [phone]                Medications Prescribed:  Current Discharge Medication List              Supplementary Documentation:

## 2024-11-19 ENCOUNTER — NURSE TRIAGE (OUTPATIENT)
Dept: FAMILY MEDICINE CLINIC | Facility: CLINIC | Age: 44
End: 2024-11-19

## 2024-11-19 RX ORDER — AZITHROMYCIN 250 MG/1
TABLET, FILM COATED ORAL
Qty: 6 TABLET | Refills: 0 | Status: SHIPPED | OUTPATIENT
Start: 2024-11-19 | End: 2024-11-24

## 2024-11-19 NOTE — TELEPHONE ENCOUNTER
Action Requested: Summary for Provider     []  Critical Lab, Recommendations Needed  [x] Need Additional Advice  []   FYI    []   Need Orders  [] Need Medications Sent to Pharmacy  []  Other     SUMMARY: Disposition per protocol  is to be seen in office today or tomorrow.  Patient declines and there are No appointments available in [facility] office this week.  Patient seen in  11/14/24 for cough x 2 days-viral URI,negative covid.  Advised OTC meds and follow up with PCP.  Today he is afebrile, denies chest pain or shortness of breath.  Cough is getting worse instead of better, more frequent and more congestion especially at night.Taking Dayquil, fluids,cough  drops which is not helping much.  Patient requests an antibiotic prescription because  his wife has the same symptoms and she was prescribed a Z Pack.    Dr Rosales, Please advise.    Reason for call: Cough  Onset: 11/12/24      Patient calling,verified name and date of birth. Calling for worsening cough since visit to  on 11/14/24. Home care not helping. Reviewed care advice to continue home care, call back for fever, chest  pain, shortness of breath or worsening cough. Patient verbalizes understanding.  Reason for Disposition   Continuous (nonstop) coughing interferes with work or school and no improvement using cough treatment per Care Advice    Protocols used: Cough-A-OH

## 2024-12-11 NOTE — TELEPHONE ENCOUNTER
Please call patient to assist in making an appointment for an annual physical exam with blood pressure med follow up. Thank you      Last office visit: 8/25/2023     Last documented annual physical exam: 11/18/2021    A [employer] message has been sent to patient     Patient has not read their [employer] message since 9/27/2023    **Please attempt all available phone numbers in patient's chart. This includes alternative numbers and contacts on patient's release of information. Thank you.

## 2024-12-11 NOTE — TELEPHONE ENCOUNTER
Dr. Rosales, please kindly review; protocol failed    No active/future labs pended - last completed - 7/10/2023    - Both ALT & AST levels elevated    - GFR normal    Patient overdue for a blood pressure follow up appointment and annual physical exam.  [employer] message sent to patient to schedule an office visit with PCP.   Routed to Call Center to call patient and make an appointment.     Comp Metabolic Panel    Component  Ref Range & Units 7/10/23  8:02 PM   eGFR-Cr  >=60 mL/min/1.73m2 98   ALT  16 - 61 U/L 72 High    AST  15 - 37 U/L 48 High      BP Readings from Last 3 Encounters:   11/14/24 155/90   09/01/23 153/86   08/25/23 139/73     No future appointments.  Last office visit: 8/25/2023    Requested Prescriptions   Pending Prescriptions Disp Refills    LISINOPRIL 20 MG Oral Tab [Pharmacy Med Name: LISINOPRIL 20MG TABLETS] 90 tablet 3     Sig: TAKE 1 TABLET(20 MG) BY MOUTH DAILY       Hypertension Medications Protocol Failed - 12/11/2024  9:56 AM        Failed - CMP or BMP in past 12 months        Failed - Last BP reading less than 140/90     BP Readings from Last 1 Encounters:   11/14/24 155/90               Failed - In person appointment or virtual visit in the past 12 mos or appointment in next 3 mos     Recent Outpatient Visits              1 year ago Depression with anxiety    [facility] [place] [facility]    Office Visit    1 year ago Transient alteration of awareness    [facility] [place]    Telemedicine    1 year ago Essential hypertension    [facility]    [facility]    1 year ago Essential hypertension    [facility] [place]    Office Visit    1 year ago Uncontrolled hypertension    [facility]    [facility]                      Failed  - EGFRCR or GFRNAA > 50     GFR Evaluation                   Recent Outpatient Visits              1 year ago Depression with anxiety    [facility] [place]    Office Visit    1 year ago Transient alteration of awareness    [facility] [place]    Telemedicine    1 year ago Essential hypertension    [facility]    Office Visit    1 year ago Essential hypertension    [facility]    Office Visit    1 year ago Uncontrolled hypertension    [facility] [place]    Office Visit

## 2024-12-13 RX ORDER — LISINOPRIL 20 MG/1
20 TABLET ORAL DAILY
Qty: 30 TABLET | Refills: 0 | Status: SHIPPED | OUTPATIENT
Start: 2024-12-13

## 2024-12-18 RX ORDER — LISINOPRIL 20 MG/1
20 TABLET ORAL DAILY
Qty: 90 TABLET | Refills: 0 | OUTPATIENT
Start: 2024-12-18

## 2024-12-18 NOTE — TELEPHONE ENCOUNTER
Disp Refills Start End    lisinopril 20 MG Oral Tab 30 tablet 0 12/13/2024 --    Sig - Route: Take 1 tablet (20 mg total) by mouth daily. **Overdue for follow up office visit/annual physical exam - appointment needed for further refills. - Oral    Sent to pharmacy as: Lisinopril 20 MG Oral Tablet (Prinivil; Zestril)    Notes to Pharmacy: 30 day refill given on 12/11/24, overdue for follow up office visit/annual physical exam - appointment needed for further refills.    E-Prescribing Status: Receipt confirmed by pharmacy (12/13/2024  2:54 PM CST)    Renewals    Renewal provider: Jennifer Rosales MD         Pharmacy    [id number] - [address], [phone], [phone]

## 2024-12-20 ENCOUNTER — TELEPHONE (OUTPATIENT)
Dept: FAMILY MEDICINE CLINIC | Facility: CLINIC | Age: 44
End: 2024-12-20

## 2024-12-20 RX ORDER — OSELTAMIVIR PHOSPHATE 75 MG/1
75 CAPSULE ORAL 2 TIMES DAILY
Qty: 10 CAPSULE | Refills: 0 | Status: SHIPPED | OUTPATIENT
Start: 2024-12-20 | End: 2024-12-25

## 2024-12-20 NOTE — TELEPHONE ENCOUNTER
Verified name and .    Wife of patient (on release of information) was advised by Dr. Rosales to call to request Tamilflu for family members who have flu symptoms since son of patient tested positive for influenza.    She is requesting prescription for Tamiful for patient as he is having flu symptoms now.    Pharmacy verified.

## 2025-01-28 RX ORDER — METOPROLOL SUCCINATE 100 MG/1
100 TABLET, EXTENDED RELEASE ORAL DAILY
Qty: 30 TABLET | Refills: 0 | Status: SHIPPED | OUTPATIENT
Start: 2025-01-28

## 2025-01-28 NOTE — TELEPHONE ENCOUNTER
Please review.  Protocol failed / Has no protocol.    Asking for refill to get to appointment date:  Future Appointments   Date Time Provider Department Center   2/11/2025  1:45 PM Jennifer Rosales MD [facility]        Requested Prescriptions   Pending Prescriptions Disp Refills    METOPROLOL SUCCINATE  MG Oral Tablet 24 Hr [Pharmacy Med Name: METOPROLOL ER SUCCINATE 100MG TABS] 30 0     Sig: TAKE 1 TABLET(100 MG) BY MOUTH DAILY       Hypertension Medications Protocol Failed - 1/28/2025 12:53 PM        Failed - CMP or BMP in past 12 months        Failed - Last BP reading less than 140/90     BP Readings from Last 1 Encounters:   11/14/24 155/90               Failed - EGFRCR or GFRNAA > 50     GFR Evaluation            Passed - In person appointment or virtual visit in the past 12 mos or appointment in next 3 mos     Recent Outpatient Visits              1 year ago Depression with anxiety    [facility] Jennifer Rosales MD    Office Visit    1 year ago Transient alteration of awareness    [facility] Jennifer Rosales MD    Telemedicine    1 year ago Essential hypertension    [facility] Jennifer Rosales MD    Office Visit    1 year ago Essential hypertension    [facility] Jennifer Rosales MD    Office Visit    1 year ago Uncontrolled hypertension    [facility] Jennifer Rosales MD    Office Visit          Future Appointments         Provider Department Appt Notes    In 2 weeks Jennifer Rosales MD [facility] px last px on file was 2021                    Passed - Medication is active on med list

## 2025-02-01 RX ORDER — METOPROLOL SUCCINATE 100 MG/1
100 TABLET, EXTENDED RELEASE ORAL DAILY
Qty: 90 TABLET | Refills: 0 | OUTPATIENT
Start: 2025-02-01

## 2025-02-01 NOTE — TELEPHONE ENCOUNTER
Outpatient Medication Detail     Disp Refills Start End    metoprolol succinate  MG Oral Tablet 24 Hr 30 tablet 0 1/28/2025 --    Sig - Route: Take 1 tablet (100 mg total) by mouth daily. **Appointment needed for further refills. - Oral    Sent to pharmacy as: Metoprolol Succinate  MG Oral Tablet Extended Release 24 Hour (Toprol XL)    Notes to Pharmacy: 01/28/25, **Appointment needed for further refills.    E-Prescribing Status: Receipt confirmed by pharmacy (1/28/2025  7:47 PM CST)      Pharmacy    [id number] - [address] AT [facility] OF [place] [address], [phone], [phone]

## 2025-02-11 ENCOUNTER — OFFICE VISIT (OUTPATIENT)
Dept: FAMILY MEDICINE CLINIC | Facility: CLINIC | Age: 45
End: 2025-02-11

## 2025-02-11 VITALS
SYSTOLIC BLOOD PRESSURE: 154 MMHG | WEIGHT: 161 LBS | HEIGHT: 74.8 IN | HEART RATE: 83 BPM | BODY MASS INDEX: 20.23 KG/M2 | OXYGEN SATURATION: 98 % | DIASTOLIC BLOOD PRESSURE: 100 MMHG

## 2025-02-11 DIAGNOSIS — Z00.00 ROUTINE PHYSICAL EXAMINATION: Primary | ICD-10-CM

## 2025-02-11 DIAGNOSIS — L03.012 PARONYCHIA OF FINGER OF LEFT HAND: ICD-10-CM

## 2025-02-11 DIAGNOSIS — F10.11 HISTORY OF ALCOHOL ABUSE: ICD-10-CM

## 2025-02-11 DIAGNOSIS — Z12.11 COLON CANCER SCREENING: ICD-10-CM

## 2025-02-11 DIAGNOSIS — I10 ESSENTIAL HYPERTENSION: ICD-10-CM

## 2025-02-11 PROBLEM — F41.1 GENERALIZED ANXIETY DISORDER WITH PANIC ATTACKS: Status: RESOLVED | Noted: 2022-08-04 | Resolved: 2025-02-11

## 2025-02-11 PROBLEM — F41.0 GENERALIZED ANXIETY DISORDER WITH PANIC ATTACKS: Status: RESOLVED | Noted: 2022-08-04 | Resolved: 2025-02-11

## 2025-02-11 PROBLEM — R40.4 TRANSIENT ALTERATION OF AWARENESS: Status: RESOLVED | Noted: 2023-08-17 | Resolved: 2025-02-11

## 2025-02-11 PROCEDURE — 99396 PREV VISIT EST AGE 40-64: CPT | Performed by: FAMILY MEDICINE

## 2025-02-11 PROCEDURE — 3080F DIAST BP >= 90 MM HG: CPT | Performed by: FAMILY MEDICINE

## 2025-02-11 PROCEDURE — 3077F SYST BP >= 140 MM HG: CPT | Performed by: FAMILY MEDICINE

## 2025-02-11 PROCEDURE — 3008F BODY MASS INDEX DOCD: CPT | Performed by: FAMILY MEDICINE

## 2025-02-11 RX ORDER — METOPROLOL SUCCINATE 100 MG/1
100 TABLET, EXTENDED RELEASE ORAL DAILY
Qty: 90 TABLET | Refills: 1 | Status: SHIPPED | OUTPATIENT
Start: 2025-02-11

## 2025-02-11 RX ORDER — SPIRONOLACTONE 100 MG/1
100 TABLET, FILM COATED ORAL DAILY
Qty: 90 TABLET | Refills: 1 | Status: SHIPPED | OUTPATIENT
Start: 2025-02-11

## 2025-02-11 RX ORDER — LISINOPRIL 20 MG/1
20 TABLET ORAL DAILY
Qty: 90 TABLET | Refills: 1 | Status: SHIPPED | OUTPATIENT
Start: 2025-02-11

## 2025-02-12 NOTE — PROGRESS NOTES
Subjective:   Patient ID: Kevin Woods is a 44 year old male.    Patient presents for routine physical and issues as below.        History/Other:   Review of Systems   Constitutional: Negative.    Respiratory: Negative.     Cardiovascular: Negative.    Gastrointestinal: Negative.    Skin: Negative.    Neurological: Negative.      Current Outpatient Medications   Medication Sig Dispense Refill    lisinopril 20 MG Oral Tab Take 1 tablet (20 mg total) by mouth daily. **Overdue for follow up office visit/annual physical exam - appointment needed for further refills. 90 tablet 1    metoprolol succinate  MG Oral Tablet 24 Hr Take 1 tablet (100 mg total) by mouth daily. **Appointment needed for further refills. 90 tablet 1    spironolactone 100 MG Oral Tab Take 1 tablet (100 mg total) by mouth daily. 90 tablet 1     Allergies:Allergies[1]    Objective:   Physical Exam  Constitutional:       Appearance: Normal appearance.   Cardiovascular:      Rate and Rhythm: Normal rate and regular rhythm.      Heart sounds: Normal heart sounds.   Pulmonary:      Effort: Pulmonary effort is normal.      Breath sounds: Normal breath sounds.   Abdominal:      Palpations: Abdomen is soft. There is no mass.      Tenderness: There is no abdominal tenderness.   Lymphadenopathy:      Cervical: No cervical adenopathy.   Skin:     General: Skin is warm and dry.   Neurological:      Mental Status: He is alert and oriented to person, place, and time.         Assessment & Plan:   1. Routine physical examination-patient is , 2 children, continuing to work full-time as a 20.  Exercising regularly with walking, starting an additional exercise program.  Return for fasting labs after restarting blood pressure medications as below.     2. Essential hypertension-has been off of medication.  History of significantly higher blood pressures in office.  Discussed reasons for blood pressure control, especially with family history of early  coronary artery disease.  Plan to take lisinopril, metoprolol, spironolactone consistently.  Once he has been taking these for 2 to 3 weeks he will check home blood pressure and enter readings into appCREAR blood pressure flowsheet.  If controlled then plan to follow-up in 6 months.   3. Paronychia of finger of left hand-fourth finger erythema at base.  Recommend warm soaks, antibiotic ointment.  Will start oral antibiotic if not improved in 2 to 3 days.   4. Colon cancer screening-discussed options for colon cancer screening.  He will proceed with colonoscopy.   5. History of alcohol abuse-last EtOH 2023.  Attended IOP/PHP.  Continuing to see therapist, attends weekly AA.  Congratulated on efforts and sobriety.       Orders Placed This Encounter   Procedures    CBC, Platelet; No Differential    Comp Metabolic Panel (14)    Lipid Panel    app[employer]AR Blood Pressure Flowsheet       Meds This Visit:  Requested Prescriptions     Signed Prescriptions Disp Refills    lisinopril 20 MG Oral Tab 90 tablet 1     Sig: Take 1 tablet (20 mg total) by mouth daily. **Overdue for follow up office visit/annual physical exam - appointment needed for further refills.    metoprolol succinate  MG Oral Tablet 24 Hr 90 tablet 1     Sig: Take 1 tablet (100 mg total) by mouth daily. **Appointment needed for further refills.    spironolactone 100 MG Oral Tab 90 tablet 1     Sig: Take 1 tablet (100 mg total) by mouth daily.       Imaging & Referrals:  OP REFERRAL TO [facility] TELEPHONE COLON SCREEN         [1] No Known Allergies

## 2025-02-12 NOTE — PATIENT INSTRUCTIONS
BP goal less than 130/80 75% of readings  Restart meds and start checking BP in 2 wks    An instructional video (available in Indonesian & English) on how to take your own blood pressure:   https://targetbp.org/tools_downloads/self-measured-blood-pressure-video/

## 2025-04-08 ENCOUNTER — HOSPITAL ENCOUNTER (OUTPATIENT)
Age: 45
Discharge: HOME | End: 2025-04-08
Payer: COMMERCIAL

## 2025-04-08 ENCOUNTER — NURSE TRIAGE (OUTPATIENT)
Dept: FAMILY MEDICINE CLINIC | Facility: CLINIC | Age: 45
End: 2025-04-08

## 2025-04-08 VITALS
RESPIRATION RATE: 20 BRPM | TEMPERATURE: 97 F | HEART RATE: 60 BPM | SYSTOLIC BLOOD PRESSURE: 109 MMHG | DIASTOLIC BLOOD PRESSURE: 69 MMHG | OXYGEN SATURATION: 100 %

## 2025-04-08 DIAGNOSIS — R11.0 NAUSEA: Primary | ICD-10-CM

## 2025-04-08 DIAGNOSIS — R19.7 DIARRHEA, UNSPECIFIED TYPE: ICD-10-CM

## 2025-04-08 LAB
POCT INFLUENZA A: NEGATIVE
POCT INFLUENZA B: NEGATIVE
SARS-COV-2 RNA RESP QL NAA+PROBE: NOT DETECTED

## 2025-04-08 PROCEDURE — U0002 COVID-19 LAB TEST NON-CDC: HCPCS | Performed by: NURSE PRACTITIONER

## 2025-04-08 PROCEDURE — 99214 OFFICE O/P EST MOD 30 MIN: CPT | Performed by: NURSE PRACTITIONER

## 2025-04-08 PROCEDURE — 87502 INFLUENZA DNA AMP PROBE: CPT | Performed by: NURSE PRACTITIONER

## 2025-04-08 PROCEDURE — S0119 ONDANSETRON 4 MG: HCPCS | Performed by: NURSE PRACTITIONER

## 2025-04-08 RX ORDER — ONDANSETRON 8 MG/1
8 TABLET, ORALLY DISINTEGRATING ORAL EVERY 8 HOURS PRN
Qty: 4 TABLET | Refills: 0 | Status: SHIPPED | OUTPATIENT
Start: 2025-04-08

## 2025-04-08 RX ORDER — ONDANSETRON 4 MG/1
8 TABLET, ORALLY DISINTEGRATING ORAL ONCE
Status: COMPLETED | OUTPATIENT
Start: 2025-04-08 | End: 2025-04-08

## 2025-04-08 NOTE — TELEPHONE ENCOUNTER
Action Requested: Summary for Provider     []  Critical Lab, Recommendations Needed  [] Need Additional Advice  []   FYI    []   Need Orders  [] Need Medications Sent to Pharmacy  []  Other     SUMMARY: states he got back from a trip and has been feeling nauseous and had some diarrhea. Not eating and drinking like normal, urinating normally. No abdominal pain or fevers. Booked an appointment for 04/10.     Reason for call: nausea/vomiting/diarrhea   Onset: two weeks    The patient called stating his family and him got back from the [facility] about two weeks ago and have been feeling sick on and off since, everyone in the family is feeling sick but the patient and his son feel the worst. They went to urgent care this AM asking for antibiotics and they were informed they could not get antibiotics from urgent care and they would need to see their primary doctor for this. Informed him he would need to schedule an appointment to be seen to get medications if appropriate.     Has had some diarrhea, no fevers not eating or drinking very well but urinating well. Has not vomited, no abdominal pain. Scheduled an appointment to be seen on 04/10.     Future Appointments   Date Time Provider Department Center   4/10/2025  8:45 AM Jennifer Rosales MD [facility]       Reason for Disposition   Nausea lasts > 1 week    Protocols used: Nausea-A-OH

## 2025-04-08 NOTE — ED PROVIDER NOTES
Patient Seen in: [facility] [place]      History     Chief Complaint   Patient presents with    Nausea/Vomiting/Diarrhea     Stated Complaint: abodminal pain    Subjective:   Well appearing 44-year-old male with essential hypertension, depression and anxiety presents with complaints of intermittent nausea since returning from [place] this past Tuesday.  Patient communicates diarrhea yesterday evening.  Patient denies vomiting.  Patient communicates that his wife and son also returned with similar GI symptoms, wife was hospitalized this Friday and had her gallbladder removed yesterday.  Patient denies fever or chills.  No blood in diarrhea.               Objective:     Past Medical History:    Anxiety    Depression    Essential hypertension              History reviewed. No pertinent surgical history.             Social History     Socioeconomic History    Marital status:    Tobacco Use    Smoking status: Former     Types: Cigarettes    Smokeless tobacco: Never   Vaping Use    Vaping status: Never Used   Substance and Sexual Activity    Alcohol use: Yes     Comment: daily    Drug use: Yes     Types: Cannabis     Comment: occasionally   Other Topics Concern    Caffeine Concern Yes     Comment: coffee              Review of Systems    Positive for stated complaint: abodminal pain  Other systems are as noted in HPI.  Constitutional and vital signs reviewed.      All other systems reviewed and negative except as noted above.    Physical Exam     ED Triage Vitals [04/08/25 0922]   /69   Pulse 60   Resp 20   Temp 97.2 °F (36.2 °C)   Temp src Oral   SpO2 100 %   O2 Device None (Room air)       Current Vitals:   Vital Signs  BP: 109/69  Pulse: 60  Resp: 20  Temp: 97.2 °F (36.2 °C)  Temp src: Oral    Oxygen Therapy  SpO2: 100 %  O2 Device: None (Room air)        Physical Exam  VS: Vital signs reviewed. 02 saturation within normal limits for this patient.    General: Patient is awake and alert, oriented to  person, place and time. Pt appears non-toxic.     HEENT: Head is normocephalic, atraumatic. Nonicteric sclera, no conjunctival injection. No facial droop or slurred speech. No oral lesions or pallor. Mucous membranes moist.     Neck: Supple. Normal ROM.    Lungs: Good inspiratory effort.  No accessory muscle use or tachypnea.    Abdomen: Soft, nontender, non-distended.    Extremities: No focal swelling or tenderness. Capillary refill noted.     Skin: Warm, dry and normal in color.     Psychiatric: Normal affect, judgement normal, insight normal.     CNS: Moves all 4 extremities. Interacts appropriately. No gait abnormality. Memory normal.        ED Course     Labs Reviewed   POCT FLU TEST - Normal    Narrative:     This assay is a rapid molecular in vitro test utilizing nucleic acid amplification of influenza A and B viral RNA.   RAPID SARS-COV-2 BY PCR - Normal      MDM   Medical Decision Making  Differential diagnosis considered included viral gastroenteritis versus bacterial gastroenteritis versus streptococcal sore throat versus cholecystitis versus H. pylori.     Influenza negative.  Rapid COVID-19 PCR not detected.     Abdomen benign.  Patient that wife and son also returned from [place] with with similar GI symptoms such as abdominal cramping, nausea, vomiting and diarrhea.  Father communicates that wife had her gallbladder removed yesterday after being hospitalized in the ER for abdominal pain.     Zofran was given in clinic for nausea. Prescription for Zofran was sent to pharmacy on file.   I discussed close PMD follow-up for further evaluation of nausea.  If symptoms persist, worsen, or any concerns patient should go to the nearest ER.      Problems Addressed:  Diarrhea, unspecified type: acute illness or injury  Nausea: acute illness or injury    Amount and/or Complexity of Data Reviewed  Labs: ordered. Decision-making details documented in ED Course.    Risk  Prescription drug management.      Disposition  and Plan     Clinical Impression:  1. Nausea    2. Diarrhea, unspecified type         Disposition:  Discharge  4/8/2025 10:16 am    Follow-up:  Jennifer Rosales MD  [address] [place], [place] [id number] [phone]    In 2 days            Medications Prescribed:  Discharge Medication List as of 4/8/2025 10:19 AM        START taking these medications    Details   ondansetron 8 MG Oral Tablet Dispersible Take 1 tablet (8 mg total) by mouth every 8 (eight) hours as needed for Nausea., Normal, Disp-4 tablet, R-0                 Supplementary Documentation:

## 2025-04-10 ENCOUNTER — OFFICE VISIT (OUTPATIENT)
Dept: FAMILY MEDICINE CLINIC | Facility: CLINIC | Age: 45
End: 2025-04-10

## 2025-04-10 VITALS
BODY MASS INDEX: 19 KG/M2 | SYSTOLIC BLOOD PRESSURE: 111 MMHG | DIASTOLIC BLOOD PRESSURE: 74 MMHG | WEIGHT: 150 LBS | OXYGEN SATURATION: 99 % | HEART RATE: 61 BPM

## 2025-04-10 DIAGNOSIS — A09 TRAVELER'S DIARRHEA: Primary | ICD-10-CM

## 2025-04-10 PROCEDURE — G2211 COMPLEX E/M VISIT ADD ON: HCPCS | Performed by: FAMILY MEDICINE

## 2025-04-10 PROCEDURE — 3078F DIAST BP <80 MM HG: CPT | Performed by: FAMILY MEDICINE

## 2025-04-10 PROCEDURE — 3074F SYST BP LT 130 MM HG: CPT | Performed by: FAMILY MEDICINE

## 2025-04-10 PROCEDURE — 99213 OFFICE O/P EST LOW 20 MIN: CPT | Performed by: FAMILY MEDICINE

## 2025-04-10 RX ORDER — LEVOFLOXACIN 500 MG/1
500 TABLET, FILM COATED ORAL DAILY
Qty: 3 TABLET | Refills: 0 | Status: SHIPPED | OUTPATIENT
Start: 2025-04-10 | End: 2025-04-13

## 2025-04-10 NOTE — PROGRESS NOTES
Subjective:   Kevin Woods is a 45 year old female who presents for Vomiting ([place] and has been vomiting and having diarrhea since Wed.// //The following individual(s) verbally consented to be recorded using ambient AI listening technology and understand that they can each withdraw their consent to this listening t)       History/Other:   History of Present Illness  The patient, with a history of migraines, presents with diarrhea, nausea and low appetite for the past eight days. He reports feeling exhausted and has been using Imodium and anti-nausea medication to manage his symptoms. He suspects that his symptoms may have been caused by eating ceviche during his last meal in the [facility] [place].  No fever or hematochezia.      Chief Complaint Reviewed and Verified  Nursing Notes Reviewed and   Verified  Tobacco Reviewed  Allergies Reviewed  Medications Reviewed         Tobacco:  He smoked tobacco in the past but quit greater than 12 months ago.  Tobacco Use[1]     Current Medications[2]           Review of Systems:  See above      Objective:   /74   Pulse 61   Wt 150 lb (68 kg)   SpO2 99%   BMI 18.85 kg/m²    Estimated body mass index is 18.85 kg/m² as calculated from the following:    Height as of 2/11/25: 6' 2.8\" (1.9 m).    Weight as of this encounter: 150 lb (68 kg).  Results  RADIOLOGY  No results found.       Physical Exam  Physical Exam  General Appearance-normal  Lungs-clear  Cardiovascular-regular rate and rhythm ABDOMEN: Bowel sounds normal abdomen non-tender      Assessment & Plan:   1. Traveler's diarrhea (Primary)  Other orders  -     levoFLOXacin; Take 1 tablet (500 mg total) by mouth daily for 3 days.  Dispense: 3 tablet; Refill: 0      Assessment & Plan  Assessment & Plan  Gastroenteritis  Symptoms consistent with traveler's diarrhea, likely foodborne. Diarrhea predominant. Partial relief with Imodium and anti-nausea medication. Discussed  potential Levaquin side effects.  - Prescribed Levaquin once daily for three days. Discontinue if symptoms resolve after one or two doses.  - Advised hydration and avoidance of lactose-containing foods until symptoms improved.            Return if symptoms worsen or fail to improve.      Jennifer Rosales MD              [1]   Social History  Tobacco Use   Smoking Status Former    Types: Cigarettes   Smokeless Tobacco Never   [2]   Current Outpatient Medications   Medication Sig Dispense Refill    levoFLOXacin 500 MG Oral Tab Take 1 tablet (500 mg total) by mouth daily for 3 days. 3 tablet 0    ondansetron 8 MG Oral Tablet Dispersible Take 1 tablet (8 mg total) by mouth every 8 (eight) hours as needed for Nausea. 4 tablet 0    lisinopril 20 MG Oral Tab Take 1 tablet (20 mg total) by mouth daily. **Overdue for follow up office visit/annual physical exam - appointment needed for further refills. 90 tablet 1    metoprolol succinate  MG Oral Tablet 24 Hr Take 1 tablet (100 mg total) by mouth daily. **Appointment needed for further refills. 90 tablet 1    spironolactone 100 MG Oral Tab Take 1 tablet (100 mg total) by mouth daily. 90 tablet 1

## 2025-08-21 RX ORDER — METOPROLOL SUCCINATE 100 MG/1
100 TABLET, EXTENDED RELEASE ORAL DAILY
Qty: 90 TABLET | Refills: 3 | Status: SHIPPED | OUTPATIENT
Start: 2025-08-21

## 2025-08-21 RX ORDER — SPIRONOLACTONE 100 MG/1
100 TABLET, FILM COATED ORAL DAILY
Qty: 90 TABLET | Refills: 3 | Status: SHIPPED | OUTPATIENT
Start: 2025-08-21

## (undated) NOTE — MR AVS SNAPSHOT
[facility]  [address], [address] [place] [address]  [phone]               Thank you for choosing us for your health care visit with Aleks Recio.  Anaid Castellon MD.  We are glad to serve you and happy to provide you with this summary [phone]     Phone:  [phone]    - Lisinopril-Hydrochlorothiazide 10-12.5 MG Tabs            Results of Recent Testing       [employer]     Call the [employer] for assistance with your inactive [employer] account    If you have qu